# Patient Record
Sex: FEMALE | Race: WHITE | Employment: OTHER | URBAN - METROPOLITAN AREA
[De-identification: names, ages, dates, MRNs, and addresses within clinical notes are randomized per-mention and may not be internally consistent; named-entity substitution may affect disease eponyms.]

---

## 2017-02-16 ENCOUNTER — OFFICE VISIT (OUTPATIENT)
Dept: RETAIL CLINIC | Facility: CLINIC | Age: 62
End: 2017-02-16

## 2017-02-16 VITALS — OXYGEN SATURATION: 97 % | HEART RATE: 72 BPM | TEMPERATURE: 97.9 F

## 2017-02-16 DIAGNOSIS — R50.9 FEVER AND CHILLS: ICD-10-CM

## 2017-02-16 DIAGNOSIS — J06.9 ACUTE URI: Primary | ICD-10-CM

## 2017-02-16 DIAGNOSIS — J02.9 ACUTE PHARYNGITIS, UNSPECIFIED ETIOLOGY: ICD-10-CM

## 2017-02-16 LAB
EXPIRATION DATE: NORMAL
EXPIRATION DATE: NORMAL
FLUAV AG NPH QL: NEGATIVE
FLUBV AG NPH QL: NEGATIVE
INTERNAL CONTROL: NORMAL
INTERNAL CONTROL: NORMAL
Lab: NORMAL
Lab: NORMAL
S PYO AG THROAT QL: NEGATIVE

## 2017-02-16 PROCEDURE — 87880 STREP A ASSAY W/OPTIC: CPT | Performed by: NURSE PRACTITIONER

## 2017-02-16 PROCEDURE — 99203 OFFICE O/P NEW LOW 30 MIN: CPT | Performed by: NURSE PRACTITIONER

## 2017-02-16 PROCEDURE — 87804 INFLUENZA ASSAY W/OPTIC: CPT | Performed by: NURSE PRACTITIONER

## 2017-02-16 RX ORDER — ALBUTEROL SULFATE 90 UG/1
2 AEROSOL, METERED RESPIRATORY (INHALATION) EVERY 4 HOURS PRN
COMMUNITY
End: 2017-07-26 | Stop reason: SDUPTHER

## 2017-02-16 RX ORDER — FLUOXETINE HYDROCHLORIDE 20 MG/1
20 CAPSULE ORAL DAILY
COMMUNITY
End: 2017-05-02

## 2017-02-16 RX ORDER — OMEPRAZOLE 40 MG/1
40 CAPSULE, DELAYED RELEASE ORAL DAILY
COMMUNITY
End: 2017-07-26

## 2017-02-17 NOTE — PATIENT INSTRUCTIONS
"Upper Respiratory Infection, Adult  Most upper respiratory infections (URIs) are caused by a virus. A URI affects the nose, throat, and upper air passages. The most common type of URI is often called \"the common cold.\"  HOME CARE   · Take medicines only as told by your doctor.  · Gargle warm saltwater or take cough drops to comfort your throat as told by your doctor.  · Use a warm mist humidifier or inhale steam from a shower to increase air moisture. This may make it easier to breathe.  · Drink enough fluid to keep your pee (urine) clear or pale yellow.  · Eat soups and other clear broths.  · Have a healthy diet.  · Rest as needed.  · Go back to work when your fever is gone or your doctor says it is okay.  ¨ You may need to stay home longer to avoid giving your URI to others.  ¨ You can also wear a face mask and wash your hands often to prevent spread of the virus.  · Use your inhaler more if you have asthma.  · Do not use any tobacco products, including cigarettes, chewing tobacco, or electronic cigarettes. If you need help quitting, ask your doctor.  GET HELP IF:  · You are getting worse, not better.  · Your symptoms are not helped by medicine.  · You have chills.  · You are getting more short of breath.  · You have brown or red mucus.  · You have yellow or brown discharge from your nose.  · You have pain in your face, especially when you bend forward.  · You have a fever.  · You have puffy (swollen) neck glands.  · You have pain while swallowing.  · You have white areas in the back of your throat.  GET HELP RIGHT AWAY IF:   · You have very bad or constant:    Headache.    Ear pain.    Pain in your forehead, behind your eyes, and over your cheekbones (sinus pain).    Chest pain.  · You have long-lasting (chronic) lung disease and any of the following:    Wheezing.    Long-lasting cough.    Coughing up blood.    A change in your usual mucus.  · You have a stiff neck.  · You have changes in your:    Vision.   "  Hearing.    Thinking.    Mood.  MAKE SURE YOU:   · Understand these instructions.  · Will watch your condition.  · Will get help right away if you are not doing well or get worse.     This information is not intended to replace advice given to you by your health care provider. Make sure you discuss any questions you have with your health care provider.     Document Released: 06/05/2009 Document Revised: 05/03/2016 Document Reviewed: 03/25/2015  [email or web address] Patient Education ©2016 [email or web address]    Use nebulizer tx's as prescribed if needed. Follow up with PCP with changes or worsening symptoms.

## 2017-02-17 NOTE — PROGRESS NOTES
Gladys Cabrales is 62 y.o. female      Cough   This is a new problem. The current episode started in the past 7 days (4-5 days). The problem has been waxing and waning. The problem occurs every few hours. The cough is non-productive. Associated symptoms include a fever, nasal congestion, rhinorrhea, a sore throat and shortness of breath. Pertinent negatives include no chest pain, chills, ear congestion, ear pain, headaches, myalgias, postnasal drip, rash or wheezing. The symptoms are aggravated by lying down. Risk factors for lung disease include smoking/tobacco exposure. She has tried a beta-agonist inhaler and body position changes for the symptoms. The treatment provided moderate relief. Her past medical history is significant for bronchitis and COPD. There is no history of asthma, bronchiectasis, environmental allergies or pneumonia. recent  and exposure to sick relatives.   Sinus Problem   This is a new problem. The current episode started in the past 7 days. The problem has been gradually worsening since onset. Maximum temperature: maybe. Associated symptoms include congestion, coughing, shortness of breath, sneezing and a sore throat. Pertinent negatives include no chills, ear pain, headaches or sinus pressure. Past treatments include saline sprays. The treatment provided mild relief.       Pt is poor historian regarding PMH and meds.      Current Outpatient Prescriptions:   •  albuterol (PROVENTIL HFA;VENTOLIN HFA) 108 (90 BASE) MCG/ACT inhaler, Inhale 2 puffs Every 4 (Four) Hours As Needed for wheezing., Disp: , Rfl:   •  FLUoxetine (PROzac) 20 MG capsule, Take 20 mg by mouth Daily., Disp: , Rfl:   •  INSULIN REGULAR HUMAN, CONC, SC, Inject  under the skin., Disp: , Rfl:   •  omeprazole (priLOSEC) 40 MG capsule, Take 40 mg by mouth Daily., Disp: , Rfl:   •  tiotropium (SPIRIVA) 18 MCG per inhalation capsule, Place 1 capsule into inhaler and inhale Daily., Disp: , Rfl:         Allergies   Allergen  Reactions   • Levaquin [Levofloxacin]    • Rocephin [Ceftriaxone]            Past Medical History   Diagnosis Date   • Acid reflux    • Anxiety    • Arthritis    • COPD (chronic obstructive pulmonary disease)    • Diverticulitis of colon    • Gallbladder abscess    • Hypertension    • Stomach ulcer    • Type 2 diabetes mellitus            No past surgical history on file.        Family History   Problem Relation Age of Onset   • Heart disease Mother    • Cancer Father    • Heart disease Father    • Cancer Brother    • Cancer Maternal Grandfather            Social History     Social History   • Marital status: Single     Spouse name: N/A   • Number of children: N/A   • Years of education: N/A     Occupational History   • unemployed      Social History Main Topics   • Smoking status: Former Smoker     Quit date: 2015   • Smokeless tobacco: Never Used   • Alcohol use No   • Drug use: No   • Sexual activity: Not on file     Other Topics Concern   • Not on file     Social History Narrative   • No narrative on file           Review of Systems   Constitutional: Positive for fever. Negative for activity change, appetite change, chills and fatigue.   HENT: Positive for congestion, rhinorrhea, sneezing and sore throat. Negative for ear discharge, ear pain, mouth sores, nosebleeds, postnasal drip, sinus pressure, trouble swallowing and voice change.    Eyes: Negative.    Respiratory: Positive for cough and shortness of breath. Negative for chest tightness and wheezing.    Cardiovascular: Negative.  Negative for chest pain.   Gastrointestinal: Negative.    Musculoskeletal: Negative for myalgias.   Skin: Negative for rash.   Allergic/Immunologic: Negative for environmental allergies.   Neurological: Negative for headaches.   Psychiatric/Behavioral: Negative.            Physical Exam   Constitutional: She is oriented to person, place, and time. Vital signs are normal. She appears well-developed and well-nourished. She does not  appear ill.   Morbid obesity   HENT:   Head: Normocephalic and atraumatic.   Right Ear: Hearing, tympanic membrane, external ear and ear canal normal.   Left Ear: Hearing, tympanic membrane, external ear and ear canal normal.   Nose: Mucosal edema and rhinorrhea present. Right sinus exhibits no maxillary sinus tenderness and no frontal sinus tenderness. Left sinus exhibits no maxillary sinus tenderness and no frontal sinus tenderness.   Mouth/Throat: Uvula is midline, oropharynx is clear and moist and mucous membranes are normal. No oropharyngeal exudate. No tonsillar exudate.   Eyes: Conjunctivae are normal. Pupils are equal, round, and reactive to light.   Neck: Neck supple.   Cardiovascular: Normal rate, regular rhythm and normal heart sounds.    No murmur heard.  Pulmonary/Chest: Effort normal. No respiratory distress. She has decreased breath sounds in the right lower field and the left lower field. She has no wheezes. She has no rhonchi. She has no rales. She exhibits no tenderness.   Lymphadenopathy:     She has no cervical adenopathy.   Neurological: She is alert and oriented to person, place, and time.   Skin: Skin is warm and dry.   Psychiatric: She has a normal mood and affect. Her behavior is normal.           Gladys was seen today for cough and sinus problem.    Diagnoses and all orders for this visit:    Acute URI    Fever and chills  -     POCT rapid strep A  -     POC Influenza A / B    Acute pharyngitis, unspecified etiology          Education instructions given to patient per diagnosis. Patient/relative verbalizes understanding of instructions.  Follow up with PCP with changes or worsening symptoms.

## 2017-05-02 ENCOUNTER — OFFICE VISIT (OUTPATIENT)
Dept: FAMILY MEDICINE CLINIC | Facility: CLINIC | Age: 62
End: 2017-05-02

## 2017-05-02 VITALS
HEIGHT: 63 IN | SYSTOLIC BLOOD PRESSURE: 124 MMHG | HEART RATE: 74 BPM | BODY MASS INDEX: 51.91 KG/M2 | RESPIRATION RATE: 18 BRPM | DIASTOLIC BLOOD PRESSURE: 74 MMHG | WEIGHT: 293 LBS | TEMPERATURE: 97.2 F

## 2017-05-02 DIAGNOSIS — I10 ESSENTIAL HYPERTENSION: ICD-10-CM

## 2017-05-02 DIAGNOSIS — E04.9 GOITER, NODULAR: ICD-10-CM

## 2017-05-02 DIAGNOSIS — K21.9 GASTROESOPHAGEAL REFLUX DISEASE, ESOPHAGITIS PRESENCE NOT SPECIFIED: ICD-10-CM

## 2017-05-02 DIAGNOSIS — K59.09 CHRONIC CONSTIPATION: ICD-10-CM

## 2017-05-02 DIAGNOSIS — Z80.0 FAMILY HISTORY OF COLON CANCER: ICD-10-CM

## 2017-05-02 DIAGNOSIS — E53.8 VITAMIN B12 DEFICIENCY: ICD-10-CM

## 2017-05-02 DIAGNOSIS — E11.22 TYPE 2 DIABETES MELLITUS WITH CHRONIC KIDNEY DISEASE, WITH LONG-TERM CURRENT USE OF INSULIN, UNSPECIFIED CKD STAGE (HCC): Primary | ICD-10-CM

## 2017-05-02 DIAGNOSIS — J44.9 CHRONIC OBSTRUCTIVE PULMONARY DISEASE, UNSPECIFIED COPD TYPE (HCC): ICD-10-CM

## 2017-05-02 DIAGNOSIS — K63.5 COLON POLYPS: ICD-10-CM

## 2017-05-02 DIAGNOSIS — E78.00 HYPERCHOLESTEROLEMIA: ICD-10-CM

## 2017-05-02 DIAGNOSIS — D50.0 IRON DEFICIENCY ANEMIA DUE TO CHRONIC BLOOD LOSS: ICD-10-CM

## 2017-05-02 DIAGNOSIS — F33.9 EPISODE OF RECURRENT MAJOR DEPRESSIVE DISORDER, UNSPECIFIED DEPRESSION EPISODE SEVERITY (HCC): ICD-10-CM

## 2017-05-02 DIAGNOSIS — Z79.4 TYPE 2 DIABETES MELLITUS WITH CHRONIC KIDNEY DISEASE, WITH LONG-TERM CURRENT USE OF INSULIN, UNSPECIFIED CKD STAGE (HCC): Primary | ICD-10-CM

## 2017-05-02 DIAGNOSIS — K64.9 BLEEDING HEMORRHOIDS: ICD-10-CM

## 2017-05-02 PROCEDURE — 99204 OFFICE O/P NEW MOD 45 MIN: CPT | Performed by: NURSE PRACTITIONER

## 2017-05-02 RX ORDER — DIAZEPAM 5 MG/1
TABLET ORAL
Refills: 0 | COMMUNITY
Start: 2017-04-05 | End: 2017-07-06 | Stop reason: SDUPTHER

## 2017-05-02 RX ORDER — ROSUVASTATIN CALCIUM 10 MG/1
TABLET, COATED ORAL
Refills: 0 | COMMUNITY
Start: 2017-04-09 | End: 2017-07-06 | Stop reason: SDUPTHER

## 2017-05-02 RX ORDER — PEN NEEDLE, DIABETIC 31 GX5/16"
NEEDLE, DISPOSABLE MISCELLANEOUS
Refills: 1 | COMMUNITY
Start: 2017-04-04 | End: 2017-08-11 | Stop reason: SDUPTHER

## 2017-05-02 RX ORDER — INSULIN GLARGINE 100 [IU]/ML
INJECTION, SOLUTION SUBCUTANEOUS
Refills: 1 | COMMUNITY
Start: 2017-04-04 | End: 2017-05-02 | Stop reason: SDUPTHER

## 2017-05-02 RX ORDER — ATENOLOL 50 MG/1
TABLET ORAL
Refills: 0 | COMMUNITY
Start: 2017-04-04 | End: 2017-07-06 | Stop reason: SDUPTHER

## 2017-05-02 RX ORDER — FLUOXETINE HYDROCHLORIDE 40 MG/1
CAPSULE ORAL
Refills: 0 | COMMUNITY
Start: 2017-04-04 | End: 2017-07-06 | Stop reason: SDUPTHER

## 2017-05-02 RX ORDER — INSULIN GLARGINE 100 [IU]/ML
25 INJECTION, SOLUTION SUBCUTANEOUS 2 TIMES DAILY
Qty: 3 PEN | Refills: 1 | Status: SHIPPED | OUTPATIENT
Start: 2017-05-02 | End: 2018-02-15 | Stop reason: SDUPTHER

## 2017-05-02 RX ORDER — OMEPRAZOLE 20 MG/1
CAPSULE, DELAYED RELEASE ORAL
Refills: 0 | COMMUNITY
Start: 2017-04-04 | End: 2017-07-06 | Stop reason: SDUPTHER

## 2017-05-03 DIAGNOSIS — I50.9 CHRONIC CONGESTIVE HEART FAILURE, UNSPECIFIED CONGESTIVE HEART FAILURE TYPE: Primary | ICD-10-CM

## 2017-05-03 DIAGNOSIS — N18.4 CKD (CHRONIC KIDNEY DISEASE) STAGE 4, GFR 15-29 ML/MIN (HCC): Primary | ICD-10-CM

## 2017-05-03 DIAGNOSIS — I10 ESSENTIAL HYPERTENSION: ICD-10-CM

## 2017-05-03 LAB
ALBUMIN SERPL-MCNC: 4.2 G/DL (ref 3.2–4.8)
ALBUMIN/GLOB SERPL: 1.4 G/DL (ref 1.5–2.5)
ALP SERPL-CCNC: 82 U/L (ref 25–100)
ALT SERPL-CCNC: 15 U/L (ref 7–40)
AST SERPL-CCNC: 27 U/L (ref 0–33)
BASOPHILS # BLD AUTO: 0.05 10*3/MM3 (ref 0–0.2)
BASOPHILS NFR BLD AUTO: 0.6 % (ref 0–1)
BILIRUB SERPL-MCNC: 0.4 MG/DL (ref 0.3–1.2)
BUN SERPL-MCNC: 26 MG/DL (ref 9–23)
BUN/CREAT SERPL: 14.4 (ref 7–25)
CALCIUM SERPL-MCNC: 9.7 MG/DL (ref 8.7–10.4)
CHLORIDE SERPL-SCNC: 103 MMOL/L (ref 99–109)
CO2 SERPL-SCNC: 28 MMOL/L (ref 20–31)
CREAT SERPL-MCNC: 1.8 MG/DL (ref 0.6–1.3)
DIFFERENTIAL COMMENT: NORMAL
EOSINOPHIL # BLD AUTO: 0.25 10*3/MM3 (ref 0.1–0.3)
EOSINOPHIL NFR BLD AUTO: 2.8 % (ref 0–3)
ERYTHROCYTE [DISTWIDTH] IN BLOOD BY AUTOMATED COUNT: 16.8 % (ref 11.3–14.5)
GLOBULIN SER CALC-MCNC: 3 GM/DL
GLUCOSE SERPL-MCNC: 272 MG/DL (ref 70–100)
HBA1C MFR BLD: 8.3 % (ref 4.8–5.6)
HCT VFR BLD AUTO: 33.1 % (ref 34.5–44)
HGB BLD-MCNC: 9.6 G/DL (ref 11.5–15.5)
IMM GRANULOCYTES # BLD: 0.01 10*3/MM3 (ref 0–0.03)
IMM GRANULOCYTES NFR BLD: 0.1 % (ref 0–0.6)
IRON SATN MFR SERPL: 6 % (ref 15–50)
IRON SERPL-MCNC: 27 MCG/DL (ref 50–175)
LYMPHOCYTES # BLD AUTO: 2.68 10*3/MM3 (ref 0.6–4.8)
LYMPHOCYTES NFR BLD AUTO: 30.4 % (ref 24–44)
MCH RBC QN AUTO: 23.8 PG (ref 27–31)
MCHC RBC AUTO-ENTMCNC: 29 G/DL (ref 32–36)
MCV RBC AUTO: 82.1 FL (ref 80–99)
MONOCYTES # BLD AUTO: 0.56 10*3/MM3 (ref 0–1)
MONOCYTES NFR BLD AUTO: 6.3 % (ref 0–12)
NEUTROPHILS # BLD AUTO: 5.28 10*3/MM3 (ref 1.5–8.3)
NEUTROPHILS NFR BLD AUTO: 59.8 % (ref 41–71)
PLATELET # BLD AUTO: 254 10*3/MM3 (ref 150–450)
PLATELET BLD QL SMEAR: NORMAL
POTASSIUM SERPL-SCNC: 4.8 MMOL/L (ref 3.5–5.5)
PROT SERPL-MCNC: 7.2 G/DL (ref 5.7–8.2)
RBC # BLD AUTO: 4.03 10*6/MM3 (ref 3.89–5.14)
RBC MORPH BLD: NORMAL
SODIUM SERPL-SCNC: 144 MMOL/L (ref 132–146)
TIBC SERPL-MCNC: 416 MCG/DL (ref 250–450)
TSH SERPL DL<=0.005 MIU/L-ACNC: 2.24 MIU/ML (ref 0.35–5.35)
UIBC SERPL-MCNC: 389 MCG/DL
VIT B12 SERPL-MCNC: 398 PG/ML (ref 211–911)
WBC # BLD AUTO: 8.83 10*3/MM3 (ref 3.5–10.8)

## 2017-07-06 RX ORDER — DIAZEPAM 5 MG/1
TABLET ORAL
Qty: 30 TABLET | Refills: 0 | Status: SHIPPED | OUTPATIENT
Start: 2017-07-06 | End: 2018-03-19 | Stop reason: SDUPTHER

## 2017-07-06 RX ORDER — FLUOXETINE HYDROCHLORIDE 40 MG/1
CAPSULE ORAL
Qty: 30 CAPSULE | Refills: 0 | Status: SHIPPED | OUTPATIENT
Start: 2017-07-06 | End: 2017-07-26 | Stop reason: SDUPTHER

## 2017-07-06 RX ORDER — ROSUVASTATIN CALCIUM 10 MG/1
TABLET, COATED ORAL
Qty: 30 TABLET | Refills: 0 | Status: SHIPPED | OUTPATIENT
Start: 2017-07-06 | End: 2017-07-26 | Stop reason: SDUPTHER

## 2017-07-06 RX ORDER — OMEPRAZOLE 20 MG/1
CAPSULE, DELAYED RELEASE ORAL
Qty: 30 CAPSULE | Refills: 0 | Status: SHIPPED | OUTPATIENT
Start: 2017-07-06 | End: 2017-07-26 | Stop reason: SDUPTHER

## 2017-07-06 RX ORDER — ATENOLOL 50 MG/1
TABLET ORAL
Qty: 30 TABLET | Refills: 0 | Status: SHIPPED | OUTPATIENT
Start: 2017-07-06 | End: 2017-07-26 | Stop reason: SDUPTHER

## 2017-07-26 ENCOUNTER — OFFICE VISIT (OUTPATIENT)
Dept: FAMILY MEDICINE CLINIC | Facility: CLINIC | Age: 62
End: 2017-07-26

## 2017-07-26 VITALS
HEART RATE: 68 BPM | DIASTOLIC BLOOD PRESSURE: 78 MMHG | TEMPERATURE: 98.3 F | SYSTOLIC BLOOD PRESSURE: 128 MMHG | BODY MASS INDEX: 51.91 KG/M2 | OXYGEN SATURATION: 98 % | HEIGHT: 63 IN | WEIGHT: 293 LBS | RESPIRATION RATE: 20 BRPM

## 2017-07-26 DIAGNOSIS — F32.A ANXIETY AND DEPRESSION: ICD-10-CM

## 2017-07-26 DIAGNOSIS — J44.9 CHRONIC OBSTRUCTIVE PULMONARY DISEASE, UNSPECIFIED COPD TYPE (HCC): ICD-10-CM

## 2017-07-26 DIAGNOSIS — F41.9 ANXIETY AND DEPRESSION: ICD-10-CM

## 2017-07-26 DIAGNOSIS — I10 ESSENTIAL HYPERTENSION: ICD-10-CM

## 2017-07-26 DIAGNOSIS — D50.0 IRON DEFICIENCY ANEMIA DUE TO CHRONIC BLOOD LOSS: ICD-10-CM

## 2017-07-26 DIAGNOSIS — Z79.4 DIABETES MELLITUS TYPE 2, INSULIN DEPENDENT (HCC): Primary | ICD-10-CM

## 2017-07-26 DIAGNOSIS — E11.9 DIABETES MELLITUS TYPE 2, INSULIN DEPENDENT (HCC): Primary | ICD-10-CM

## 2017-07-26 DIAGNOSIS — K64.9 HEMORRHOIDS, UNSPECIFIED HEMORRHOID TYPE: ICD-10-CM

## 2017-07-26 DIAGNOSIS — R53.83 FATIGUE, UNSPECIFIED TYPE: ICD-10-CM

## 2017-07-26 DIAGNOSIS — E78.00 ELEVATED CHOLESTEROL: ICD-10-CM

## 2017-07-26 PROCEDURE — 99214 OFFICE O/P EST MOD 30 MIN: CPT | Performed by: NURSE PRACTITIONER

## 2017-07-26 RX ORDER — OMEPRAZOLE 20 MG/1
20 CAPSULE, DELAYED RELEASE ORAL DAILY
Qty: 90 CAPSULE | Refills: 1 | Status: SHIPPED | OUTPATIENT
Start: 2017-07-26

## 2017-07-26 RX ORDER — ALBUTEROL SULFATE 90 UG/1
2 AEROSOL, METERED RESPIRATORY (INHALATION) EVERY 4 HOURS PRN
Qty: 18 G | Refills: 5 | Status: SHIPPED | OUTPATIENT
Start: 2017-07-26

## 2017-07-26 RX ORDER — FLUOXETINE HYDROCHLORIDE 40 MG/1
40 CAPSULE ORAL DAILY
Qty: 90 CAPSULE | Refills: 1 | Status: SHIPPED | OUTPATIENT
Start: 2017-07-26 | End: 2018-02-15 | Stop reason: SDUPTHER

## 2017-07-26 RX ORDER — ATENOLOL 50 MG/1
50 TABLET ORAL DAILY
Qty: 90 TABLET | Refills: 1 | Status: SHIPPED | OUTPATIENT
Start: 2017-07-26 | End: 2018-02-04 | Stop reason: SDUPTHER

## 2017-07-26 RX ORDER — ALBUTEROL SULFATE 2.5 MG/3ML
2.5 SOLUTION RESPIRATORY (INHALATION) EVERY 4 HOURS PRN
COMMUNITY

## 2017-07-26 RX ORDER — ROSUVASTATIN CALCIUM 10 MG/1
10 TABLET, COATED ORAL NIGHTLY
Qty: 90 TABLET | Refills: 1 | Status: SHIPPED | OUTPATIENT
Start: 2017-07-26 | End: 2018-06-27 | Stop reason: SDUPTHER

## 2017-07-26 NOTE — PROGRESS NOTES
Subjective   Gladys Cabrales is a 62 y.o. female.     History of Present Illness   F/U DM, CKD, COPD  Had an appt with Dr Champion was told kidney function had improving; being treated for UTI by Dr Champion  Sees Cardiologist Aug 23, 2017 for CHF  Still having SOA, LOPEZ, no CP, no palpitations, no swelling in lower extremities  Needs refills on meds  Taking DM meds, but not following diet. Low BS once, but did not eat like she was supposed to  Had been checking BS, but stopped, did not record it.  COPD  Needs refill on her inhaler  Due for eye exam, + hx of cataracts  Has screening colonoscopy scheduled      The following portions of the patient's history were reviewed and updated as appropriate: allergies, current medications, past family history, past medical history, past social history, past surgical history and problem list.    Review of Systems   Constitutional: Negative.    HENT: Negative.    Eyes: Positive for visual disturbance (cannot see out of her right eye very well, was supposed to have cataract surgery but did not have it. Due for eye exam).   Respiratory: Positive for shortness of breath.    Cardiovascular: Negative.  Negative for chest pain and palpitations.   Gastrointestinal: Positive for blood in stool and constipation. Negative for abdominal pain. Anal bleeding:  +hx of constipation and bleeding hemorrhoids, due for colonoscopy.   Endocrine: Negative.    Genitourinary: Negative.    Musculoskeletal: Negative.    Skin: Negative.    Allergic/Immunologic: Negative.    Neurological: Negative.  Negative for dizziness and headaches.   Hematological: Negative.    Psychiatric/Behavioral: Positive for dysphoric mood. The patient is nervous/anxious.        Objective   Physical Exam   Constitutional: She is oriented to person, place, and time. She appears well-developed and well-nourished. No distress.   HENT:   Head: Normocephalic.   Neck: Neck supple. No thyromegaly present.   Cardiovascular: Normal rate, regular  rhythm and normal heart sounds.    Pulmonary/Chest: Effort normal and breath sounds normal.   Abdominal: Soft. Bowel sounds are normal. She exhibits no distension. There is no tenderness.   Musculoskeletal: She exhibits no edema.   Lymphadenopathy:     She has no cervical adenopathy.   Neurological: She is alert and oriented to person, place, and time.   Skin: Skin is warm and dry.   Psychiatric: She has a normal mood and affect. Her behavior is normal.   Nursing note and vitals reviewed.      Assessment/Plan   Gladys was seen today for follow-up, diabetes, copd and chronic kidney disease.    Diagnoses and all orders for this visit:    Diabetes mellitus type 2, insulin dependent  -     Hemoglobin A1c; Future    Essential hypertension  -     TSH; Future  -     Comprehensive metabolic panel; Future    Chronic obstructive pulmonary disease, unspecified COPD type    Elevated cholesterol  -     Lipid panel; Future    Anxiety and depression    Iron deficiency anemia due to chronic blood loss  -     CBC w AUTO Differential; Future  -     Iron and TIBC; Future    Hemorrhoids, unspecified hemorrhoid type    Fatigue, unspecified type    Other orders  -     rosuvastatin (CRESTOR) 10 MG tablet; Take 1 tablet by mouth Every Night.  -     omeprazole (priLOSEC) 20 MG capsule; Take 1 capsule by mouth Daily.  -     FLUoxetine (PROzac) 40 MG capsule; Take 1 capsule by mouth Daily.  -     atenolol (TENORMIN) 50 MG tablet; Take 1 tablet by mouth Daily.  -     albuterol (PROVENTIL HFA;VENTOLIN HFA) 108 (90 BASE) MCG/ACT inhaler; Inhale 2 puffs Every 4 (Four) Hours As Needed for Wheezing.      Will have pt return in 2 weeks to have fasting labs  Will refill meds  Will have pt schedule eye exam  Will have pt keep Cardiology appt  Will schedule screening mammogram at follow up visit since pt has trouble standing for the testing.  Will see pt back in 3 months

## 2017-07-31 ENCOUNTER — RESULTS ENCOUNTER (OUTPATIENT)
Dept: FAMILY MEDICINE CLINIC | Facility: CLINIC | Age: 62
End: 2017-07-31

## 2017-07-31 DIAGNOSIS — E78.00 ELEVATED CHOLESTEROL: ICD-10-CM

## 2017-07-31 DIAGNOSIS — Z79.4 DIABETES MELLITUS TYPE 2, INSULIN DEPENDENT (HCC): ICD-10-CM

## 2017-07-31 DIAGNOSIS — I10 ESSENTIAL HYPERTENSION: ICD-10-CM

## 2017-07-31 DIAGNOSIS — E11.9 DIABETES MELLITUS TYPE 2, INSULIN DEPENDENT (HCC): ICD-10-CM

## 2017-07-31 DIAGNOSIS — D50.0 IRON DEFICIENCY ANEMIA DUE TO CHRONIC BLOOD LOSS: ICD-10-CM

## 2017-08-11 RX ORDER — PEN NEEDLE, DIABETIC 31 GX5/16"
NEEDLE, DISPOSABLE MISCELLANEOUS
Qty: 100 EACH | Refills: 4 | Status: SHIPPED | OUTPATIENT
Start: 2017-08-11

## 2017-08-19 LAB
ALBUMIN SERPL-MCNC: 3.8 G/DL (ref 3.2–4.8)
ALBUMIN/GLOB SERPL: 1.5 G/DL (ref 1.5–2.5)
ALP SERPL-CCNC: 101 U/L (ref 25–100)
ALT SERPL-CCNC: 13 U/L (ref 7–40)
AST SERPL-CCNC: 21 U/L (ref 0–33)
BASOPHILS # BLD AUTO: 0.04 10*3/MM3 (ref 0–0.2)
BASOPHILS NFR BLD AUTO: 0.7 % (ref 0–1)
BILIRUB SERPL-MCNC: 0.3 MG/DL (ref 0.3–1.2)
BUN SERPL-MCNC: 25 MG/DL (ref 9–23)
BUN/CREAT SERPL: 19.2 (ref 7–25)
CALCIUM SERPL-MCNC: 9.8 MG/DL (ref 8.7–10.4)
CHLORIDE SERPL-SCNC: 106 MMOL/L (ref 99–109)
CHOLEST SERPL-MCNC: 170 MG/DL (ref 0–200)
CO2 SERPL-SCNC: 21 MMOL/L (ref 20–31)
CREAT SERPL-MCNC: 1.3 MG/DL (ref 0.6–1.3)
EOSINOPHIL # BLD AUTO: 0.18 10*3/MM3 (ref 0–0.3)
EOSINOPHIL NFR BLD AUTO: 3.3 % (ref 0–3)
ERYTHROCYTE [DISTWIDTH] IN BLOOD BY AUTOMATED COUNT: 18.7 % (ref 11.3–14.5)
GLOBULIN SER CALC-MCNC: 2.6 GM/DL
GLUCOSE SERPL-MCNC: 183 MG/DL (ref 70–100)
HBA1C MFR BLD: 7.7 % (ref 4.8–5.6)
HCT VFR BLD AUTO: 30 % (ref 34.5–44)
HDLC SERPL-MCNC: 45 MG/DL (ref 40–60)
HGB BLD-MCNC: 8.1 G/DL (ref 11.5–15.5)
IMM GRANULOCYTES # BLD: 0.01 10*3/MM3 (ref 0–0.03)
IMM GRANULOCYTES NFR BLD: 0.2 % (ref 0–0.6)
IRON SATN MFR SERPL: 6 % (ref 15–50)
IRON SERPL-MCNC: 22 MCG/DL (ref 50–175)
LDLC SERPL CALC-MCNC: 84 MG/DL (ref 0–100)
LYMPHOCYTES # BLD AUTO: 2.31 10*3/MM3 (ref 0.6–4.8)
LYMPHOCYTES NFR BLD AUTO: 41.8 % (ref 24–44)
MCH RBC QN AUTO: 20.9 PG (ref 27–31)
MCHC RBC AUTO-ENTMCNC: 27 G/DL (ref 32–36)
MCV RBC AUTO: 77.5 FL (ref 80–99)
MONOCYTES # BLD AUTO: 0.34 10*3/MM3 (ref 0–1)
MONOCYTES NFR BLD AUTO: 6.2 % (ref 0–12)
NEUTROPHILS # BLD AUTO: 2.64 10*3/MM3 (ref 1.5–8.3)
NEUTROPHILS NFR BLD AUTO: 47.8 % (ref 41–71)
PLATELET # BLD AUTO: 220 10*3/MM3 (ref 150–450)
POTASSIUM SERPL-SCNC: 4.7 MMOL/L (ref 3.5–5.5)
PROT SERPL-MCNC: 6.4 G/DL (ref 5.7–8.2)
RBC # BLD AUTO: 3.87 10*6/MM3 (ref 3.89–5.14)
SODIUM SERPL-SCNC: 138 MMOL/L (ref 132–146)
TIBC SERPL-MCNC: 377 MCG/DL (ref 250–450)
TRIGL SERPL-MCNC: 203 MG/DL (ref 0–150)
TSH SERPL DL<=0.005 MIU/L-ACNC: 2.21 MIU/ML (ref 0.35–5.35)
UIBC SERPL-MCNC: 355 MCG/DL
VLDLC SERPL CALC-MCNC: 40.6 MG/DL
WBC # BLD AUTO: 5.52 10*3/MM3 (ref 3.5–10.8)

## 2017-08-24 ENCOUNTER — TELEPHONE (OUTPATIENT)
Dept: FAMILY MEDICINE CLINIC | Facility: CLINIC | Age: 62
End: 2017-08-24

## 2017-08-24 DIAGNOSIS — Z79.4 TYPE 2 DIABETES MELLITUS WITH COMPLICATION, WITH LONG-TERM CURRENT USE OF INSULIN (HCC): Primary | ICD-10-CM

## 2017-08-24 DIAGNOSIS — E11.8 TYPE 2 DIABETES MELLITUS WITH COMPLICATION, WITH LONG-TERM CURRENT USE OF INSULIN (HCC): Primary | ICD-10-CM

## 2017-08-24 NOTE — TELEPHONE ENCOUNTER
----- Message from Maryuri Yap sent at 8/24/2017 11:30 AM EDT -----  Contact: CARINA GUILLERMINA  PATIENT IS NEEDING A SCRIPT FOR HER TEST STRIPS FREE STYLE LITE SHE TESTS 2 X  A DAY OR AS NEEDED PLEASE SEND OVER TO  YASMINE HODGE PATIENT CAN BE REACHED FOR QUESTIONS  [phone]

## 2017-11-14 ENCOUNTER — OFFICE VISIT (OUTPATIENT)
Dept: FAMILY MEDICINE CLINIC | Facility: CLINIC | Age: 62
End: 2017-11-14

## 2017-11-14 VITALS
HEIGHT: 63 IN | TEMPERATURE: 97.1 F | RESPIRATION RATE: 24 BRPM | DIASTOLIC BLOOD PRESSURE: 70 MMHG | BODY MASS INDEX: 51.91 KG/M2 | WEIGHT: 293 LBS | SYSTOLIC BLOOD PRESSURE: 130 MMHG | HEART RATE: 60 BPM

## 2017-11-14 DIAGNOSIS — I50.9 CHRONIC CONGESTIVE HEART FAILURE, UNSPECIFIED CONGESTIVE HEART FAILURE TYPE: ICD-10-CM

## 2017-11-14 DIAGNOSIS — R00.1 BRADYCARDIA: ICD-10-CM

## 2017-11-14 DIAGNOSIS — E11.65 TYPE 2 DIABETES MELLITUS WITH HYPERGLYCEMIA, WITH LONG-TERM CURRENT USE OF INSULIN (HCC): ICD-10-CM

## 2017-11-14 DIAGNOSIS — R07.9 CHEST PAIN RADIATING TO JAW: ICD-10-CM

## 2017-11-14 DIAGNOSIS — N18.4 CHRONIC RENAL IMPAIRMENT, STAGE 4 (SEVERE) (HCC): ICD-10-CM

## 2017-11-14 DIAGNOSIS — Z01.818 PRE-OP EVALUATION: Primary | ICD-10-CM

## 2017-11-14 DIAGNOSIS — Z79.4 TYPE 2 DIABETES MELLITUS WITH HYPERGLYCEMIA, WITH LONG-TERM CURRENT USE OF INSULIN (HCC): ICD-10-CM

## 2017-11-14 LAB
ALBUMIN SERPL-MCNC: 4.1 G/DL (ref 3.2–4.8)
ALBUMIN/GLOB SERPL: 1.5 G/DL (ref 1.5–2.5)
ALP SERPL-CCNC: 106 U/L (ref 25–100)
ALT SERPL-CCNC: 16 U/L (ref 7–40)
AST SERPL-CCNC: 23 U/L (ref 0–33)
BASOPHILS # BLD AUTO: 0.06 10*3/MM3 (ref 0–0.2)
BASOPHILS NFR BLD AUTO: 0.8 % (ref 0–1)
BILIRUB SERPL-MCNC: 0.2 MG/DL (ref 0.3–1.2)
BUN SERPL-MCNC: 36 MG/DL (ref 9–23)
BUN/CREAT SERPL: 20 (ref 7–25)
CALCIUM SERPL-MCNC: 9.1 MG/DL (ref 8.7–10.4)
CHLORIDE SERPL-SCNC: 107 MMOL/L (ref 99–109)
CO2 SERPL-SCNC: 26 MMOL/L (ref 20–31)
CREAT SERPL-MCNC: 1.8 MG/DL (ref 0.6–1.3)
EOSINOPHIL # BLD AUTO: 0.33 10*3/MM3 (ref 0–0.3)
EOSINOPHIL NFR BLD AUTO: 4.4 % (ref 0–3)
ERYTHROCYTE [DISTWIDTH] IN BLOOD BY AUTOMATED COUNT: 18.3 % (ref 11.3–14.5)
GFR SERPLBLD CREATININE-BSD FMLA CKD-EPI: 28 ML/MIN/1.73
GFR SERPLBLD CREATININE-BSD FMLA CKD-EPI: 34 ML/MIN/1.73
GLOBULIN SER CALC-MCNC: 2.8 GM/DL
GLUCOSE SERPL-MCNC: 183 MG/DL (ref 70–100)
HBA1C MFR BLD: 7.5 % (ref 4.8–5.6)
HCT VFR BLD AUTO: 33.3 % (ref 34.5–44)
HGB BLD-MCNC: 9.2 G/DL (ref 11.5–15.5)
IMM GRANULOCYTES # BLD: 0.01 10*3/MM3 (ref 0–0.03)
IMM GRANULOCYTES NFR BLD: 0.1 % (ref 0–0.6)
LYMPHOCYTES # BLD AUTO: 2.84 10*3/MM3 (ref 0.6–4.8)
LYMPHOCYTES NFR BLD AUTO: 37.7 % (ref 24–44)
MCH RBC QN AUTO: 22.1 PG (ref 27–31)
MCHC RBC AUTO-ENTMCNC: 27.6 G/DL (ref 32–36)
MCV RBC AUTO: 79.9 FL (ref 80–99)
MONOCYTES # BLD AUTO: 0.56 10*3/MM3 (ref 0–1)
MONOCYTES NFR BLD AUTO: 7.4 % (ref 0–12)
NEUTROPHILS # BLD AUTO: 3.73 10*3/MM3 (ref 1.5–8.3)
NEUTROPHILS NFR BLD AUTO: 49.6 % (ref 41–71)
PLATELET # BLD AUTO: 290 10*3/MM3 (ref 150–450)
POTASSIUM SERPL-SCNC: 5 MMOL/L (ref 3.5–5.5)
PROT SERPL-MCNC: 6.9 G/DL (ref 5.7–8.2)
RBC # BLD AUTO: 4.17 10*6/MM3 (ref 3.89–5.14)
SODIUM SERPL-SCNC: 142 MMOL/L (ref 132–146)
WBC # BLD AUTO: 7.53 10*3/MM3 (ref 3.5–10.8)

## 2017-11-14 PROCEDURE — 93000 ELECTROCARDIOGRAM COMPLETE: CPT | Performed by: NURSE PRACTITIONER

## 2017-11-14 PROCEDURE — 99214 OFFICE O/P EST MOD 30 MIN: CPT | Performed by: NURSE PRACTITIONER

## 2017-11-14 RX ORDER — PROMETHAZINE HYDROCHLORIDE 25 MG/1
25 TABLET ORAL EVERY 6 HOURS PRN
Qty: 30 TABLET | Refills: 0 | Status: SHIPPED | OUTPATIENT
Start: 2017-11-14

## 2017-11-14 RX ORDER — PANTOPRAZOLE SODIUM 40 MG/1
40 TABLET, DELAYED RELEASE ORAL DAILY
Qty: 30 TABLET | Refills: 5 | Status: SHIPPED | OUTPATIENT
Start: 2017-11-14

## 2017-11-14 NOTE — PROGRESS NOTES
Subjective   Gladys Cabrales is a 62 y.o. female.     History of Present Illness   Pre-op for right cataract surgery 11/28 and 12/12 to have left cataract removed    Having chest pains and palpitations, nausea and pressure on her chest; CP radiating into left jaw, 2 days ago CP woke her up in the night  Feeling lightheaded and shortness of breath with exertion, fatigue, just not feeling good lately.  This morning she was having heart palpitations.  Thinks she is having problems with her stomach again, having RUQ pain, Omeprazole not helping.  No hx of heart attack or blockages in her heart  Was supposed to go see cardiologist but did not keep appt (kept telling her daughter she didn't feel like getting out to go)  Heart cath 12 years ago, heart was enlarged but no blockages, + hx CHF, DM type 2 on insulin, obesity, HLP, HTN  Twin brother with had CAD 7 stents    Has had general anesthesia in the past,   Difficulty breathing with last surgery was intubated and went to ICU after gallbladder surgery 2007  SOA with exertion, no swelling in ankles or feet  Quit smoking 4 years ago  HTN, DM type 2, CHF, COPD, HLP, anxiety, GERD     The following portions of the patient's history were reviewed and updated as appropriate: allergies, current medications, past family history, past medical history, past social history, past surgical history and problem list.    Review of Systems   Constitutional: Positive for activity change and fatigue.   HENT: Negative.    Eyes: Positive for visual disturbance.   Respiratory: Positive for chest tightness and shortness of breath.    Cardiovascular: Positive for chest pain and palpitations. Negative for leg swelling.   Endocrine: Negative.    Genitourinary: Negative.    Musculoskeletal: Negative.    Skin: Negative.    Allergic/Immunologic: Negative.    Neurological: Positive for light-headedness.   Hematological: Negative.    Psychiatric/Behavioral: Positive for sleep disturbance.       Objective    Physical Exam   Constitutional: She is oriented to person, place, and time. She appears well-developed and well-nourished.   HENT:   Head: Normocephalic.   Nose: Nose normal.   Eyes: Lids are normal.   Neck: Trachea normal and normal range of motion. Neck supple. Decreased carotid pulses present. No JVD present. Carotid bruit is not present. No thyromegaly present.   Cardiovascular: Regular rhythm, S1 normal and S2 normal.  Bradycardia present.  Exam reveals distant heart sounds.    Pulmonary/Chest: Effort normal and breath sounds normal.   Musculoskeletal: She exhibits no edema.   Lymphadenopathy:     She has no cervical adenopathy.   Neurological: She is alert and oriented to person, place, and time.   Skin: Skin is warm and dry. She is not diaphoretic. There is pallor.   Psychiatric: She has a normal mood and affect. Her speech is normal and behavior is normal. Judgment and thought content normal. Cognition and memory are normal.   Nursing note and vitals reviewed.      ECG 12 Lead  Date/Time: 11/14/2017 9:51 AM  Performed by: SARAN LIM  Authorized by: SARAN LIM   Previous ECG: no previous ECG available  Rhythm: sinus bradycardia  Rate: bradycardic  BPM: 50  ST Segments: ST segments normal  T Waves: T waves normal  Other findings: prolonged QTc interval  Clinical impression: abnormal ECG  Comments: Possible anterior infarct-age undetermined          Assessment/Plan   Gladys was seen today for pre-op exam.    Diagnoses and all orders for this visit:    Pre-op evaluation  -     Comprehensive Metabolic Panel  -     CBC & Differential  -     ECG 12 Lead    Chest pain radiating to jaw  -     Stress Test With Myocardial Perfusion - One Day  -     ECG 12 Lead  -     Ambulatory Referral to Cardiology    Bradycardia  -     Stress Test With Myocardial Perfusion - One Day  -     Ambulatory Referral to Cardiology    Chronic congestive heart failure, unspecified congestive heart failure type  -     Stress Test With  Myocardial Perfusion - One Day  -     Ambulatory Referral to Cardiology    Chronic renal impairment, stage 4 (severe)    Type 2 diabetes mellitus with hyperglycemia, with long-term current use of insulin  -     Hemoglobin A1c    Other orders  -     promethazine (PHENERGAN) 25 MG tablet; Take 1 tablet by mouth Every 6 (Six) Hours As Needed for Nausea or Vomiting.  -     pantoprazole (PROTONIX) 40 MG EC tablet; Take 1 tablet by mouth Daily.      Will have pt get labs and EKG   EKG shows bradycardia.   Labs show elevation of glucose but normal A1c, impaired renal function stage 4, and anemia.     Recommend pt hold off on cataract surgery until she has cardiac clearance and perfusion study due to pt reports of chest pain/palpitations. Referral to cardiology.

## 2017-11-15 PROBLEM — I50.9 CHRONIC CONGESTIVE HEART FAILURE (HCC): Status: ACTIVE | Noted: 2017-11-15

## 2017-11-15 PROBLEM — N18.4 CHRONIC RENAL IMPAIRMENT, STAGE 4 (SEVERE) (HCC): Status: ACTIVE | Noted: 2017-11-15

## 2017-11-15 PROBLEM — E11.65 TYPE 2 DIABETES MELLITUS WITH HYPERGLYCEMIA, WITH LONG-TERM CURRENT USE OF INSULIN (HCC): Status: ACTIVE | Noted: 2017-11-15

## 2017-11-15 PROBLEM — Z79.4 TYPE 2 DIABETES MELLITUS WITH HYPERGLYCEMIA, WITH LONG-TERM CURRENT USE OF INSULIN (HCC): Status: ACTIVE | Noted: 2017-11-15

## 2017-11-22 ENCOUNTER — TELEPHONE (OUTPATIENT)
Dept: CARDIOLOGY | Facility: CLINIC | Age: 62
End: 2017-11-22

## 2017-11-22 NOTE — TELEPHONE ENCOUNTER
Left VM advising will need to have stress test before appt due to providers needing that info for pre op clearance recommendations.

## 2017-12-05 ENCOUNTER — HOSPITAL ENCOUNTER (OUTPATIENT)
Dept: CARDIOLOGY | Facility: HOSPITAL | Age: 62
Discharge: HOME | End: 2017-12-05

## 2017-12-05 VITALS — HEART RATE: 55 BPM | BODY MASS INDEX: 61.11 KG/M2 | WEIGHT: 293 LBS

## 2017-12-05 PROCEDURE — A9500 TC99M SESTAMIBI: HCPCS | Performed by: NURSE PRACTITIONER

## 2017-12-05 PROCEDURE — 93017 CV STRESS TEST TRACING ONLY: CPT

## 2017-12-05 PROCEDURE — 0 TECHNETIUM SESTAMIBI: Performed by: NURSE PRACTITIONER

## 2017-12-05 PROCEDURE — 78452 HT MUSCLE IMAGE SPECT MULT: CPT | Performed by: INTERNAL MEDICINE

## 2017-12-05 PROCEDURE — 78452 HT MUSCLE IMAGE SPECT MULT: CPT

## 2017-12-05 PROCEDURE — 93018 CV STRESS TEST I&R ONLY: CPT | Performed by: INTERNAL MEDICINE

## 2017-12-05 PROCEDURE — 25010000002 REGADENOSON 0.4 MG/5ML SOLUTION: Performed by: NURSE PRACTITIONER

## 2017-12-05 RX ADMIN — TECHNETIUM TC-99M SESTAMIBI 1 DOSE: 1 INJECTION INTRAVENOUS at 09:15

## 2017-12-05 RX ADMIN — TECHNETIUM TC-99M SESTAMIBI 1 DOSE: 1 INJECTION INTRAVENOUS at 07:45

## 2017-12-05 RX ADMIN — REGADENOSON 0.4 MG: 0.08 INJECTION, SOLUTION INTRAVENOUS at 09:16

## 2017-12-07 LAB
BH CV STRESS BP STAGE 1: NORMAL
BH CV STRESS BP STAGE 2: NORMAL
BH CV STRESS BP STAGE 4: NORMAL
BH CV STRESS COMMENTS STAGE 1: NORMAL
BH CV STRESS DOSE REGADENOSON STAGE 1: 0.4
BH CV STRESS DURATION MIN STAGE 1: 1
BH CV STRESS DURATION MIN STAGE 2: 1
BH CV STRESS DURATION MIN STAGE 3: 1
BH CV STRESS DURATION MIN STAGE 4: 1
BH CV STRESS DURATION SEC STAGE 1: 15
BH CV STRESS DURATION SEC STAGE 2: 0
BH CV STRESS HR STAGE 1: 77
BH CV STRESS HR STAGE 2: 77
BH CV STRESS HR STAGE 3: 71
BH CV STRESS HR STAGE 4: 68
BH CV STRESS O2 STAGE 1: 99
BH CV STRESS O2 STAGE 2: 99
BH CV STRESS O2 STAGE 3: 100
BH CV STRESS PROTOCOL 1: NORMAL
BH CV STRESS RECOVERY BP: NORMAL MMHG
BH CV STRESS RECOVERY HR: 68 BPM
BH CV STRESS STAGE 1: 1
BH CV STRESS STAGE 2: 2
BH CV STRESS STAGE 3: 3
BH CV STRESS STAGE 4: 4
LV EF NUC BP: 53 %
MAXIMAL PREDICTED HEART RATE: 156 BPM
PERCENT MAX PREDICTED HR: 51.28 %
STRESS BASELINE BP: NORMAL MMHG
STRESS BASELINE HR: 54 BPM
STRESS PERCENT HR: 60 %
STRESS POST PEAK BP: NORMAL MMHG
STRESS POST PEAK HR: 80 BPM
STRESS TARGET HR: 133 BPM

## 2017-12-20 ENCOUNTER — OFFICE VISIT (OUTPATIENT)
Dept: CARDIOLOGY | Facility: CLINIC | Age: 62
End: 2017-12-20

## 2017-12-20 VITALS
DIASTOLIC BLOOD PRESSURE: 76 MMHG | HEIGHT: 61 IN | WEIGHT: 293 LBS | BODY MASS INDEX: 55.32 KG/M2 | SYSTOLIC BLOOD PRESSURE: 118 MMHG | HEART RATE: 59 BPM

## 2017-12-20 DIAGNOSIS — I20.9 ANGINA PECTORIS (HCC): ICD-10-CM

## 2017-12-20 DIAGNOSIS — R94.39 ABNORMAL MYOCARDIAL PERFUSION STUDY: Primary | ICD-10-CM

## 2017-12-20 DIAGNOSIS — K21.9 GASTROESOPHAGEAL REFLUX DISEASE, ESOPHAGITIS PRESENCE NOT SPECIFIED: ICD-10-CM

## 2017-12-20 DIAGNOSIS — I10 ESSENTIAL HYPERTENSION: ICD-10-CM

## 2017-12-20 DIAGNOSIS — E78.5 DYSLIPIDEMIA: ICD-10-CM

## 2017-12-20 PROCEDURE — 99204 OFFICE O/P NEW MOD 45 MIN: CPT | Performed by: INTERNAL MEDICINE

## 2017-12-20 NOTE — PROGRESS NOTES
Subjective:     Encounter Date:12/20/2017      Patient ID: Gladys Cabrales is a 62 y.o. female.    Chief Complaint:Chest Pain (CONSULT) and Congestive Heart Failure    PROBLEM LIST:  1. Chest pain  a. 12/5/17 MPS with inferior and lateral wall infarction. No ischemia. EF 53%.  2. Hypertension  3. Dyslipidemia  4. Diabetes mellitus  5. Morbid obesity  6. Bilateral cataracts  7. History of CHF  8. COPD with nocturnal oxygen  9. GERD/HH  10. History of diverticulitis  11. Surgeries:  a. Cholecystectomy  b. Total abdominal hysterectomy         Allergies   Allergen Reactions   • Levaquin [Levofloxacin]    • Rocephin [Ceftriaxone]          Current Outpatient Prescriptions:   •  albuterol (PROVENTIL HFA;VENTOLIN HFA) 108 (90 BASE) MCG/ACT inhaler, Inhale 2 puffs Every 4 (Four) Hours As Needed for Wheezing., Disp: 18 g, Rfl: 5  •  albuterol (PROVENTIL) (2.5 MG/3ML) 0.083% nebulizer solution, Take 2.5 mg by nebulization Every 4 (Four) Hours As Needed for Wheezing or Shortness of Air., Disp: , Rfl:   •  atenolol (TENORMIN) 50 MG tablet, Take 1 tablet by mouth Daily., Disp: 90 tablet, Rfl: 1  •  B-D ULTRAFINE III SHORT PEN 31G X 8 MM misc, USE WITH LANTUS TWICE DAILY, Disp: 100 each, Rfl: 4  •  diazePAM (VALIUM) 5 MG tablet, TAKE ONE TABLET BY MOUTH TWICE DAILY AS NEEDED, Disp: 30 tablet, Rfl: 0  •  FLUoxetine (PROzac) 40 MG capsule, Take 1 capsule by mouth Daily., Disp: 90 capsule, Rfl: 1  •  glucose blood (FREESTYLE LITE) test strip, Use as instructed, Disp: 100 each, Rfl: 12  •  hydrocortisone 2.5 % cream, Apply  topically 2 (Two) Times a Day., Disp: 453.6 g, Rfl: 0  •  LANTUS SOLOSTAR 100 UNIT/ML injection pen, Inject 25 Units under the skin 2 (Two) Times a Day., Disp: 3 pen, Rfl: 1  •  omeprazole (priLOSEC) 20 MG capsule, Take 1 capsule by mouth Daily., Disp: 90 capsule, Rfl: 1  •  pantoprazole (PROTONIX) 40 MG EC tablet, Take 1 tablet by mouth Daily., Disp: 30 tablet, Rfl: 5  •  promethazine (PHENERGAN) 25 MG tablet, Take 1  tablet by mouth Every 6 (Six) Hours As Needed for Nausea or Vomiting., Disp: 30 tablet, Rfl: 0  •  rosuvastatin (CRESTOR) 10 MG tablet, Take 1 tablet by mouth Every Night., Disp: 90 tablet, Rfl: 1        History of Present Illness    Patient is a 62-year-old  female who we are seeing today for further evaluation of chest pain.  Over the course of the last 6 months she has had some recurrent chest pain.  Some of this is described as a sharp sensation.  Other episodes are described as a pressure/tightness sensation.  This can occur with exertion and typically alleviates with rest.  It will last for just a few minutes and then resolved.  There is associated shortness of breath with this.  Denies any syncope.  Does note some positional dizziness as well as some occasional feet edema.  She is preop for bilateral cataract surgery.    The following portions of the patient's history were reviewed and updated as appropriate: allergies, current medications, past family history, past medical history, past social history, past surgical history and problem list.    Family History   Problem Relation Age of Onset   • Heart disease Mother    • Hyperlipidemia Mother    • Diabetes Mother    • Hypertension Mother    • Cancer Father    • Heart disease Father    • Colon cancer Father    • Cancer Brother    • Cancer Maternal Grandfather        Social History   Substance Use Topics   • Smoking status: Former Smoker     Quit date: 2015   • Smokeless tobacco: Never Used   • Alcohol use No         Review of Systems   Constitution: Negative for fever, weakness and malaise/fatigue.   HENT: Negative for nosebleeds.    Eyes: Negative for redness and visual disturbance.   Cardiovascular: Negative for orthopnea, palpitations and paroxysmal nocturnal dyspnea.   Respiratory: Positive for shortness of breath and sputum production. Negative for cough, snoring and wheezing.    Hematologic/Lymphatic: Negative for bleeding problem.   Skin:  "Negative for flushing, itching and rash.   Musculoskeletal: Negative for falls, joint pain and muscle cramps.   Gastrointestinal: Positive for abdominal pain, heartburn and hematochezia. Negative for diarrhea, nausea and vomiting.   Genitourinary: Negative for hematuria.   Neurological: Negative for excessive daytime sleepiness, dizziness, headaches and tremors.   Psychiatric/Behavioral: Negative for substance abuse. The patient is not nervous/anxious.           Objective:    height is 154.9 cm (61\") and weight is 157 kg (346 lb) (abnormal). Her blood pressure is 118/76 and her pulse is 59.         Physical Exam   Constitutional: She is oriented to person, place, and time. She appears well-developed and well-nourished.   HENT:   Head: Normocephalic and atraumatic.   Mouth/Throat: Oropharynx is clear and moist.   Eyes: Conjunctivae are normal. Pupils are equal, round, and reactive to light.   Neck: Normal carotid pulses and no JVD present. Carotid bruit is not present. No thyromegaly present.   Cardiovascular: Normal rate, regular rhythm, S1 normal and S2 normal.  Exam reveals no gallop and no friction rub.    No murmur heard.  Pulses:       Carotid pulses are 2+ on the right side, and 2+ on the left side.       Dorsalis pedis pulses are 2+ on the right side, and 2+ on the left side.        Posterior tibial pulses are 2+ on the right side, and 2+ on the left side.   Pulmonary/Chest: No respiratory distress. She has no wheezes. She has no rales. She exhibits no tenderness.   Abdominal: She exhibits no distension, no abdominal bruit and no mass. There is no hepatosplenomegaly. There is no tenderness. There is no rebound.   Musculoskeletal: She exhibits no edema, tenderness or deformity.   Lymphadenopathy:     She has no cervical adenopathy.   Neurological: She is alert and oriented to person, place, and time. She has normal strength.   Skin: Skin is warm and dry. No rash noted. No cyanosis. Nails show no clubbing. "   Psychiatric: She has a normal mood and affect. Cognition and memory are normal.       Procedures          Assessment:   Assessment/Plan      Gladys was seen today for chest pain and congestive heart failure.    Diagnoses and all orders for this visit:    Abnormal myocardial perfusion study    Angina pectoris    Essential hypertension    Dyslipidemia    Gastroesophageal reflux disease, esophagitis presence not specified      1.  New onset chest pain syndrome consistent with angina.  Patient at baseline functional class III-IV due to morbid obesity and significant COPD.  Abnormal myocardial perfusion study suggesting inferior and lateral ischemia, multivessel and high risk  3.  Dyslipidemia  4.  Hypertension controlled  5.  Significant COPD, oxygen dependent  6.  Morbid obesity  7.  GERD    Recommendations discussed the stress test with the patient today regarding options.  Given the high risk test, and multiple suboptimally controlled cardiac risk factors, would recommend left heart catheterization plus or minus PCI.  The patient is willing to proceed, after risk and benefits have been explained.  We'll defer further treatment of her GERD to primary care physician.  We have asked her start an aspirin 81 mg daily at this time.  We'll check a fasting lipid profile to his medical therapy as needed at the time of her heart catheterization.       Nhi IZAGUIRRE scribed portions of this dictation for  Dr. Lynn.  I, Quentin Lynn MD, personally performed the services described in this documentation as scribed by the above individual in my presence, and it is both accurate and complete    Dictated utilizing Dragon dictation

## 2017-12-21 DIAGNOSIS — R94.39 ABNORMAL STRESS TEST: Primary | ICD-10-CM

## 2017-12-21 DIAGNOSIS — I50.9 CHRONIC CONGESTIVE HEART FAILURE, UNSPECIFIED CONGESTIVE HEART FAILURE TYPE: ICD-10-CM

## 2017-12-26 ENCOUNTER — PREP FOR SURGERY (OUTPATIENT)
Dept: OTHER | Facility: HOSPITAL | Age: 62
End: 2017-12-26

## 2017-12-26 DIAGNOSIS — R94.39 ABNORMAL STRESS TEST: Primary | ICD-10-CM

## 2017-12-26 RX ORDER — ONDANSETRON 2 MG/ML
4 INJECTION INTRAMUSCULAR; INTRAVENOUS EVERY 6 HOURS PRN
Status: CANCELLED | OUTPATIENT
Start: 2017-12-26

## 2017-12-26 RX ORDER — NITROGLYCERIN 0.4 MG/1
0.4 TABLET SUBLINGUAL
Status: CANCELLED | OUTPATIENT
Start: 2017-12-26

## 2017-12-26 RX ORDER — ACETAMINOPHEN 325 MG/1
650 TABLET ORAL EVERY 4 HOURS PRN
Status: CANCELLED | OUTPATIENT
Start: 2017-12-26

## 2017-12-26 RX ORDER — ASPIRIN 325 MG
325 TABLET, DELAYED RELEASE (ENTERIC COATED) ORAL DAILY
Status: CANCELLED | OUTPATIENT
Start: 2017-12-27

## 2017-12-26 RX ORDER — ASPIRIN 325 MG
325 TABLET ORAL ONCE
Status: CANCELLED | OUTPATIENT
Start: 2017-12-26 | End: 2017-12-26

## 2017-12-26 RX ORDER — SODIUM CHLORIDE 0.9 % (FLUSH) 0.9 %
1-10 SYRINGE (ML) INJECTION AS NEEDED
Status: CANCELLED | OUTPATIENT
Start: 2017-12-26

## 2018-01-04 PROBLEM — R94.39 ABNORMAL STRESS TEST: Status: ACTIVE | Noted: 2018-01-04

## 2018-01-05 ENCOUNTER — HOSPITAL ENCOUNTER (OUTPATIENT)
Facility: HOSPITAL | Age: 63
Discharge: HOME | End: 2018-01-05
Attending: INTERNAL MEDICINE | Admitting: INTERNAL MEDICINE

## 2018-01-05 ENCOUNTER — APPOINTMENT (OUTPATIENT)
Dept: GENERAL RADIOLOGY | Facility: HOSPITAL | Age: 63
End: 2018-01-05

## 2018-01-05 VITALS
HEART RATE: 60 BPM | SYSTOLIC BLOOD PRESSURE: 162 MMHG | RESPIRATION RATE: 18 BRPM | WEIGHT: 293 LBS | OXYGEN SATURATION: 96 % | BODY MASS INDEX: 51.91 KG/M2 | TEMPERATURE: 97.7 F | DIASTOLIC BLOOD PRESSURE: 70 MMHG | HEIGHT: 63 IN

## 2018-01-05 DIAGNOSIS — R94.39 ABNORMAL STRESS TEST: ICD-10-CM

## 2018-01-05 DIAGNOSIS — I50.9 CHRONIC CONGESTIVE HEART FAILURE, UNSPECIFIED CONGESTIVE HEART FAILURE TYPE: ICD-10-CM

## 2018-01-05 LAB
ALBUMIN SERPL-MCNC: 4.1 G/DL (ref 3.2–4.8)
ALBUMIN/GLOB SERPL: 1.5 G/DL (ref 1.5–2.5)
ALP SERPL-CCNC: 97 U/L (ref 25–100)
ALT SERPL W P-5'-P-CCNC: 19 U/L (ref 7–40)
ANION GAP SERPL CALCULATED.3IONS-SCNC: 14 MMOL/L (ref 3–11)
ARTICHOKE IGE QN: 112 MG/DL (ref 0–130)
AST SERPL-CCNC: 27 U/L (ref 0–33)
BILIRUB SERPL-MCNC: 0.5 MG/DL (ref 0.3–1.2)
BUN BLD-MCNC: 31 MG/DL (ref 9–23)
BUN/CREAT SERPL: 20.7 (ref 7–25)
CALCIUM SPEC-SCNC: 9 MG/DL (ref 8.7–10.4)
CHLORIDE SERPL-SCNC: 104 MMOL/L (ref 99–109)
CHOLEST SERPL-MCNC: 176 MG/DL (ref 0–200)
CO2 SERPL-SCNC: 23 MMOL/L (ref 20–31)
CREAT BLD-MCNC: 1.5 MG/DL (ref 0.6–1.3)
DEPRECATED RDW RBC AUTO: 51.9 FL (ref 37–54)
ERYTHROCYTE [DISTWIDTH] IN BLOOD BY AUTOMATED COUNT: 18.3 % (ref 11.3–14.5)
GFR SERPL CREATININE-BSD FRML MDRD: 35 ML/MIN/1.73
GLOBULIN UR ELPH-MCNC: 2.8 GM/DL
GLUCOSE BLD-MCNC: 180 MG/DL (ref 70–100)
GLUCOSE BLDC GLUCOMTR-MCNC: 158 MG/DL (ref 70–130)
HBA1C MFR BLD: 7.5 % (ref 4.8–5.6)
HCT VFR BLD AUTO: 31.8 % (ref 34.5–44)
HDLC SERPL-MCNC: 46 MG/DL (ref 40–60)
HGB BLD-MCNC: 9.1 G/DL (ref 11.5–15.5)
MCH RBC QN AUTO: 22.4 PG (ref 27–31)
MCHC RBC AUTO-ENTMCNC: 28.6 G/DL (ref 32–36)
MCV RBC AUTO: 78.1 FL (ref 80–99)
PLATELET # BLD AUTO: 227 10*3/MM3 (ref 150–450)
PMV BLD AUTO: 11.1 FL (ref 6–12)
POTASSIUM BLD-SCNC: 4.4 MMOL/L (ref 3.5–5.5)
PROT SERPL-MCNC: 6.9 G/DL (ref 5.7–8.2)
RBC # BLD AUTO: 4.07 10*6/MM3 (ref 3.89–5.14)
SODIUM BLD-SCNC: 141 MMOL/L (ref 132–146)
TRIGL SERPL-MCNC: 177 MG/DL (ref 0–150)
WBC NRBC COR # BLD: 7.24 10*3/MM3 (ref 3.5–10.8)

## 2018-01-05 PROCEDURE — C1769 GUIDE WIRE: HCPCS | Performed by: INTERNAL MEDICINE

## 2018-01-05 PROCEDURE — 83036 HEMOGLOBIN GLYCOSYLATED A1C: CPT | Performed by: NURSE PRACTITIONER

## 2018-01-05 PROCEDURE — 93458 L HRT ARTERY/VENTRICLE ANGIO: CPT | Performed by: INTERNAL MEDICINE

## 2018-01-05 PROCEDURE — G0108 DIAB MANAGE TRN  PER INDIV: HCPCS | Performed by: REGISTERED NURSE

## 2018-01-05 PROCEDURE — 63710000001 ASPIRIN 325 MG TABLET: Performed by: NURSE PRACTITIONER

## 2018-01-05 PROCEDURE — 36415 COLL VENOUS BLD VENIPUNCTURE: CPT

## 2018-01-05 PROCEDURE — 25010000002 HEPARIN (PORCINE) PER 1000 UNITS: Performed by: INTERNAL MEDICINE

## 2018-01-05 PROCEDURE — 25010000002 MIDAZOLAM PER 1 MG: Performed by: INTERNAL MEDICINE

## 2018-01-05 PROCEDURE — 80061 LIPID PANEL: CPT | Performed by: NURSE PRACTITIONER

## 2018-01-05 PROCEDURE — 0 IOPAMIDOL PER 1 ML: Performed by: INTERNAL MEDICINE

## 2018-01-05 PROCEDURE — 25010000002 FENTANYL CITRATE (PF) 100 MCG/2ML SOLUTION: Performed by: INTERNAL MEDICINE

## 2018-01-05 PROCEDURE — A9270 NON-COVERED ITEM OR SERVICE: HCPCS | Performed by: NURSE PRACTITIONER

## 2018-01-05 PROCEDURE — 85027 COMPLETE CBC AUTOMATED: CPT | Performed by: NURSE PRACTITIONER

## 2018-01-05 PROCEDURE — 82962 GLUCOSE BLOOD TEST: CPT

## 2018-01-05 PROCEDURE — 71045 X-RAY EXAM CHEST 1 VIEW: CPT

## 2018-01-05 PROCEDURE — 80053 COMPREHEN METABOLIC PANEL: CPT | Performed by: NURSE PRACTITIONER

## 2018-01-05 PROCEDURE — C1894 INTRO/SHEATH, NON-LASER: HCPCS | Performed by: INTERNAL MEDICINE

## 2018-01-05 RX ORDER — ACETAMINOPHEN 325 MG/1
650 TABLET ORAL EVERY 4 HOURS PRN
Status: DISCONTINUED | OUTPATIENT
Start: 2018-01-05 | End: 2018-01-05 | Stop reason: HOSPADM

## 2018-01-05 RX ORDER — NITROGLYCERIN 0.4 MG/1
0.4 TABLET SUBLINGUAL
Status: DISCONTINUED | OUTPATIENT
Start: 2018-01-05 | End: 2018-01-05 | Stop reason: HOSPADM

## 2018-01-05 RX ORDER — MIDAZOLAM HYDROCHLORIDE 1 MG/ML
INJECTION INTRAMUSCULAR; INTRAVENOUS AS NEEDED
Status: DISCONTINUED | OUTPATIENT
Start: 2018-01-05 | End: 2018-01-05 | Stop reason: HOSPADM

## 2018-01-05 RX ORDER — ONDANSETRON 2 MG/ML
4 INJECTION INTRAMUSCULAR; INTRAVENOUS EVERY 6 HOURS PRN
Status: DISCONTINUED | OUTPATIENT
Start: 2018-01-05 | End: 2018-01-05 | Stop reason: HOSPADM

## 2018-01-05 RX ORDER — ASPIRIN 325 MG
325 TABLET, DELAYED RELEASE (ENTERIC COATED) ORAL DAILY
Status: DISCONTINUED | OUTPATIENT
Start: 2018-01-06 | End: 2018-01-05 | Stop reason: HOSPADM

## 2018-01-05 RX ORDER — LIDOCAINE HYDROCHLORIDE 10 MG/ML
INJECTION, SOLUTION EPIDURAL; INFILTRATION; INTRACAUDAL; PERINEURAL AS NEEDED
Status: DISCONTINUED | OUTPATIENT
Start: 2018-01-05 | End: 2018-01-05 | Stop reason: HOSPADM

## 2018-01-05 RX ORDER — HYDROCODONE BITARTRATE AND ACETAMINOPHEN 5; 325 MG/1; MG/1
1 TABLET ORAL EVERY 4 HOURS PRN
Status: DISCONTINUED | OUTPATIENT
Start: 2018-01-05 | End: 2018-01-05 | Stop reason: HOSPADM

## 2018-01-05 RX ORDER — SODIUM CHLORIDE 9 MG/ML
1-3 INJECTION, SOLUTION INTRAVENOUS CONTINUOUS
Status: DISCONTINUED | OUTPATIENT
Start: 2018-01-05 | End: 2018-01-05 | Stop reason: HOSPADM

## 2018-01-05 RX ORDER — ASPIRIN 325 MG
325 TABLET ORAL ONCE
Status: COMPLETED | OUTPATIENT
Start: 2018-01-05 | End: 2018-01-05

## 2018-01-05 RX ORDER — DIPHENHYDRAMINE HYDROCHLORIDE 50 MG/ML
25 INJECTION INTRAMUSCULAR; INTRAVENOUS EVERY 6 HOURS PRN
Status: DISCONTINUED | OUTPATIENT
Start: 2018-01-05 | End: 2018-01-05 | Stop reason: HOSPADM

## 2018-01-05 RX ORDER — FENTANYL CITRATE 50 UG/ML
INJECTION, SOLUTION INTRAMUSCULAR; INTRAVENOUS AS NEEDED
Status: DISCONTINUED | OUTPATIENT
Start: 2018-01-05 | End: 2018-01-05 | Stop reason: HOSPADM

## 2018-01-05 RX ORDER — SODIUM CHLORIDE 0.9 % (FLUSH) 0.9 %
1-10 SYRINGE (ML) INJECTION AS NEEDED
Status: DISCONTINUED | OUTPATIENT
Start: 2018-01-05 | End: 2018-01-05 | Stop reason: HOSPADM

## 2018-01-05 RX ORDER — ASPIRIN 81 MG/1
81 TABLET ORAL DAILY
COMMUNITY

## 2018-01-05 RX ORDER — FUROSEMIDE 40 MG/1
40 TABLET ORAL DAILY PRN
COMMUNITY

## 2018-01-05 RX ADMIN — ASPIRIN 325 MG ORAL TABLET 325 MG: 325 PILL ORAL at 10:04

## 2018-01-05 NOTE — PLAN OF CARE
Problem: Patient Care Overview (Adult)  Goal: Plan of Care Review  Outcome: Ongoing (interventions implemented as appropriate)   01/05/18 1327   Coping/Psychosocial Response Interventions   Plan Of Care Reviewed With patient;family   Patient Care Overview   Progress improving   Outcome Evaluation   Outcome Summary/Follow up Plan PT AND FAMILY ABLE TO VERBALIZE UNDERSTANDING OF DC INSTRUCTIONS- NO QUESTIONS AT THIS TIME- SITE STABLE AND AMBULATING WITH NO ISSUES.     Goal: Discharge Needs Assessment  Outcome: Ongoing (interventions implemented as appropriate)   01/05/18 1327   Discharge Needs Assessment   Concerns To Be Addressed no discharge needs identified       Problem: Cardiac Catheterization with/without PCI (Adult)  Goal: Signs and Symptoms of Listed Potential Problems Will be Absent or Manageable (Cardiac Catheterization with/without PCI)  Outcome: Ongoing (interventions implemented as appropriate)   01/05/18 1327   Cardiac Catheterization with/without PCI   Problems Assessed (Cardiac Catheterization) all   Problems Present (Cardiac Catheterization) none

## 2018-01-05 NOTE — CONSULTS
"Diabetes Education  Assessment/Teaching    Patient Name:  Gladys Cabrales  YOB: 1955  MRN: [id number]  Admit Date:  1/5/2018      Assessment Date:  1/5/2018       Most Recent Value    General Information      Referral From:  A1c, Blood glucose, MD order    Height  160 cm (63\")    Height Method  Stated    Weight  (!)  156 kg (343 lb 4.1 oz)    Pregnancy Assessment     Diabetes History     What type of diabetes do you have?  Type 2    Length of Diabetes Diagnosis  10 + years    Current DM knowledge  fair    Have you had diabetes education/teaching in the past?  no    Do you test your blood sugar at home?  yes    Frequency of checks  Twice daily    Meter type  Freestyle Lite    Who performs the test?  Self    Typical readings  150 in AM    Have you had low blood sugar? (<70mg/dl)  no    Have you had high blood sugar? (>140mg/dl)  yes    How often do you have high blood sugar?  occasionally    Education Preferences     Nutrition Information     Assessment Topics     Healthy Eating - Assessment  Needs education    Being Active - Assessment  Needs education    Reducing Risk - Assessment  Needs education    DM Goals     Contact Plan  -- [FU with PCP]               Most Recent Value    DM Education Needs     Meter  Has own    Meter Type  Freestyle    Frequency of Testing  4 times a day    Blood Glucose Target Range  80 to 130    Medication  Insulin, Actions, Side effects    Problem Solving  Hypoglycemia, Hyperglycemia, Sick days, Signs, Symptoms, Treatment    Reducing Risks  A1C testing, Immunizations    Physical Activity  None    Physical Activity Frequency  Never    Healthy Coping  Appropriate    Motivation  Moderate    Teaching Method  Explanation, Discussion, Handouts, Teach back    Patient Response  Verbalized understanding, Needs reinforcement        Ms. Cabrales states she has been a Type 2 diabetic for 10+ years. She states she takes Lantus BID. She checks her BG levels twice daily at home. Her current A1c " is 7.5%. Discussed her taking her BG more often for better control.    Discussed and taught patient about type 2 diabetes self-management, risk factors, and importance of blood glucose control to reduce complications. Target blood glucose readings and A1c goals per ADA were reviewed. Reviewed with patient current A1c and discussed its significance. Signs, symptoms and treatment of hyperglycemia and hypoglycemia were discussed. Lifestyle changes such as physical activity with MD approval and healthy eating were encouraged. Pt instructed to  check blood sugar 4 times per day and to call PCP if  Blood glucose is higher than 180 two times or more.  Patient was encouraged to keep record of blood glucose readings to take to follow up appointment with PCP.          Electronically signed by:  Naina Hopson RN  01/05/18 12:53 PM

## 2018-01-05 NOTE — PLAN OF CARE
Problem: Cardiac Catheterization with/without PCI (Adult)  Goal: Signs and Symptoms of Listed Potential Problems Will be Absent or Manageable (Cardiac Catheterization with/without PCI)  Outcome: Ongoing (interventions implemented as appropriate)   01/05/18 1021   Cardiac Catheterization with/without PCI   Problems Assessed (Cardiac Catheterization) all   Problems Present (Cardiac Catheterization) none

## 2018-01-08 ENCOUNTER — TELEPHONE (OUTPATIENT)
Dept: CARDIOLOGY | Facility: CLINIC | Age: 63
End: 2018-01-08

## 2018-01-08 NOTE — TELEPHONE ENCOUNTER
Talked with her daughter who will take her to have a BMP done and make sure that we get the results.

## 2018-01-08 NOTE — TELEPHONE ENCOUNTER
Received a call regarding Gladys Cabrales from her dtr.   Says that the patient just had a cath on Friday and is to have a procedure on her kidneys on Wednesday where they would use dye.   Wonders if it is ok this soon after the heart cath.   Creatinine was 1.5 on Friday.  Not sure whether that was before or after the heart cath.

## 2018-02-06 RX ORDER — ATENOLOL 50 MG/1
TABLET ORAL
Qty: 90 TABLET | Refills: 1 | Status: SHIPPED | OUTPATIENT
Start: 2018-02-06 | End: 2018-08-08 | Stop reason: SDUPTHER

## 2018-02-15 RX ORDER — INSULIN GLARGINE 100 [IU]/ML
INJECTION, SOLUTION SUBCUTANEOUS
Qty: 45 ML | Refills: 1 | Status: SHIPPED | OUTPATIENT
Start: 2018-02-15

## 2018-02-15 RX ORDER — FLUOXETINE HYDROCHLORIDE 40 MG/1
CAPSULE ORAL
Qty: 90 CAPSULE | Refills: 1 | Status: SHIPPED | OUTPATIENT
Start: 2018-02-15 | End: 2018-08-27 | Stop reason: SDUPTHER

## 2018-03-05 ENCOUNTER — OFFICE VISIT (OUTPATIENT)
Dept: FAMILY MEDICINE CLINIC | Facility: CLINIC | Age: 63
End: 2018-03-05

## 2018-03-05 VITALS
HEART RATE: 60 BPM | WEIGHT: 293 LBS | SYSTOLIC BLOOD PRESSURE: 110 MMHG | BODY MASS INDEX: 51.91 KG/M2 | OXYGEN SATURATION: 98 % | TEMPERATURE: 97.3 F | RESPIRATION RATE: 24 BRPM | DIASTOLIC BLOOD PRESSURE: 60 MMHG | HEIGHT: 63 IN

## 2018-03-05 DIAGNOSIS — K21.9 GASTROESOPHAGEAL REFLUX DISEASE, ESOPHAGITIS PRESENCE NOT SPECIFIED: ICD-10-CM

## 2018-03-05 DIAGNOSIS — I50.9 CHRONIC CONGESTIVE HEART FAILURE, UNSPECIFIED CONGESTIVE HEART FAILURE TYPE: ICD-10-CM

## 2018-03-05 DIAGNOSIS — Z01.818 PRE-OP EXAMINATION: Primary | ICD-10-CM

## 2018-03-05 DIAGNOSIS — E78.5 DYSLIPIDEMIA: ICD-10-CM

## 2018-03-05 DIAGNOSIS — E66.01 MORBID OBESITY (HCC): ICD-10-CM

## 2018-03-05 DIAGNOSIS — K62.5 BRBPR (BRIGHT RED BLOOD PER RECTUM): ICD-10-CM

## 2018-03-05 DIAGNOSIS — N18.4 CHRONIC RENAL IMPAIRMENT, STAGE 4 (SEVERE) (HCC): ICD-10-CM

## 2018-03-05 DIAGNOSIS — J42 CHRONIC BRONCHITIS, UNSPECIFIED CHRONIC BRONCHITIS TYPE (HCC): ICD-10-CM

## 2018-03-05 DIAGNOSIS — Z79.4 TYPE 2 DIABETES MELLITUS WITH HYPERGLYCEMIA, WITH LONG-TERM CURRENT USE OF INSULIN (HCC): ICD-10-CM

## 2018-03-05 DIAGNOSIS — I10 ESSENTIAL HYPERTENSION: ICD-10-CM

## 2018-03-05 DIAGNOSIS — Z01.810 PREOP CARDIOVASCULAR EXAM: ICD-10-CM

## 2018-03-05 DIAGNOSIS — G47.33 OBSTRUCTIVE SLEEP APNEA SYNDROME: ICD-10-CM

## 2018-03-05 DIAGNOSIS — D50.0 IRON DEFICIENCY ANEMIA DUE TO CHRONIC BLOOD LOSS: ICD-10-CM

## 2018-03-05 DIAGNOSIS — E11.65 TYPE 2 DIABETES MELLITUS WITH HYPERGLYCEMIA, WITH LONG-TERM CURRENT USE OF INSULIN (HCC): ICD-10-CM

## 2018-03-05 LAB
ALBUMIN SERPL-MCNC: 4.1 G/DL (ref 3.2–4.8)
ALBUMIN/GLOB SERPL: 1.4 G/DL (ref 1.5–2.5)
ALP SERPL-CCNC: 108 U/L (ref 25–100)
ALT SERPL-CCNC: 23 U/L (ref 7–40)
AST SERPL-CCNC: 30 U/L (ref 0–33)
BASOPHILS # BLD AUTO: 0.05 10*3/MM3 (ref 0–0.2)
BASOPHILS NFR BLD AUTO: 0.8 % (ref 0–1)
BILIRUB SERPL-MCNC: 0.2 MG/DL (ref 0.3–1.2)
BUN SERPL-MCNC: 31 MG/DL (ref 9–23)
BUN/CREAT SERPL: 19.4 (ref 7–25)
CALCIUM SERPL-MCNC: 9.3 MG/DL (ref 8.7–10.4)
CHLORIDE SERPL-SCNC: 109 MMOL/L (ref 99–109)
CHOLEST SERPL-MCNC: 284 MG/DL (ref 0–200)
CO2 SERPL-SCNC: 23 MMOL/L (ref 20–31)
CREAT SERPL-MCNC: 1.6 MG/DL (ref 0.6–1.3)
EOSINOPHIL # BLD AUTO: 0.22 10*3/MM3 (ref 0–0.3)
EOSINOPHIL NFR BLD AUTO: 3.3 % (ref 0–3)
ERYTHROCYTE [DISTWIDTH] IN BLOOD BY AUTOMATED COUNT: 19.1 % (ref 11.3–14.5)
GFR SERPLBLD CREATININE-BSD FMLA CKD-EPI: 32 ML/MIN/1.73
GFR SERPLBLD CREATININE-BSD FMLA CKD-EPI: 39 ML/MIN/1.73
GLOBULIN SER CALC-MCNC: 2.9 GM/DL
GLUCOSE SERPL-MCNC: 198 MG/DL (ref 70–100)
HBA1C MFR BLD: 7.5 % (ref 4.8–5.6)
HCT VFR BLD AUTO: 36 % (ref 34.5–44)
HDLC SERPL-MCNC: 58 MG/DL (ref 40–60)
HGB BLD-MCNC: 10 G/DL (ref 11.5–15.5)
IMM GRANULOCYTES # BLD: 0.01 10*3/MM3 (ref 0–0.03)
IMM GRANULOCYTES NFR BLD: 0.2 % (ref 0–0.6)
IRON SATN MFR SERPL: 7 % (ref 15–50)
IRON SERPL-MCNC: 26 MCG/DL (ref 50–175)
LDLC SERPL CALC-MCNC: 174 MG/DL (ref 0–100)
LYMPHOCYTES # BLD AUTO: 2.63 10*3/MM3 (ref 0.6–4.8)
LYMPHOCYTES NFR BLD AUTO: 39.5 % (ref 24–44)
MCH RBC QN AUTO: 22.4 PG (ref 27–31)
MCHC RBC AUTO-ENTMCNC: 27.8 G/DL (ref 32–36)
MCV RBC AUTO: 80.7 FL (ref 80–99)
MONOCYTES # BLD AUTO: 0.5 10*3/MM3 (ref 0–1)
MONOCYTES NFR BLD AUTO: 7.5 % (ref 0–12)
NEUTROPHILS # BLD AUTO: 3.25 10*3/MM3 (ref 1.5–8.3)
NEUTROPHILS NFR BLD AUTO: 48.7 % (ref 41–71)
PLATELET # BLD AUTO: 259 10*3/MM3 (ref 150–450)
POTASSIUM SERPL-SCNC: 5.2 MMOL/L (ref 3.5–5.5)
PROT SERPL-MCNC: 7 G/DL (ref 5.7–8.2)
RBC # BLD AUTO: 4.46 10*6/MM3 (ref 3.89–5.14)
SODIUM SERPL-SCNC: 140 MMOL/L (ref 132–146)
TIBC SERPL-MCNC: 392 MCG/DL (ref 250–450)
TRIGL SERPL-MCNC: 258 MG/DL (ref 0–150)
UIBC SERPL-MCNC: 366 MCG/DL
VLDLC SERPL CALC-MCNC: 51.6 MG/DL
WBC # BLD AUTO: 6.66 10*3/MM3 (ref 3.5–10.8)

## 2018-03-05 PROCEDURE — 93000 ELECTROCARDIOGRAM COMPLETE: CPT | Performed by: PHYSICIAN ASSISTANT

## 2018-03-05 PROCEDURE — 99214 OFFICE O/P EST MOD 30 MIN: CPT | Performed by: PHYSICIAN ASSISTANT

## 2018-03-05 NOTE — PROGRESS NOTES
Subjective   Gladys Cabrales is a 63 y.o. female  who presents to the office today for a preoperative consultation at the request of surgeon Dr. Mayfield who plans on performing R  And L cataract surgery  on March 13 and March 27, respectively.  This consultation is requested for the specific conditions prompting preoperative evaluation (i.e. because of potential affect on operative risk): New onset chest pain syndrome consistent with angina.  Patient at baseline functional class III-IV due to morbid obesity and significant COPD.Abnormal myocardial perfusion study suggesting inferior and lateral ischemia, multivessel and high risk, Dyslipidemia, Hypertension controlled, Significant COPD, oxygen dependent, Morbid obesity, GERD, DM- insulin dependent, renal dysfunction. Planned anesthesia: local. The patient has the following known anesthesia issues: past general anesthesia with complications (per patient difficulty coming out of anesthesia, had to be on life support for a time) Last general anesthesia was 4 years ago after Gallbladder surgery.  Patients bleeding risk: recent abnormal bleeding (reports frequent BRBPR). Patient does not have objections to receiving blood products if needed.    Had initial pre-op 11/14/17 for bilateral cataract surgery with ZINA Taylor. At that time patient reported frequent chest pain. Was encouraged to have cardiac evaluation prior to surgical approval. During evaluation, patient had abnormal cardiac perfusion study and proceeded with cardiac cath. No blockages reported. Will attach findings with pre-op note.     Chronic GI issues. States she has BRBPR almost daily. Has had colonoscopy in the past. Last evaluation was 4 years ago. Was told she needed colonoscopy every 2 years. Was having so much stomach cramping and upset she has not done this. States she is ready to follow through with GI consult. Reports hx of iron deficiency. Can not tolerate oral supplementation (GI s/e) has had  infusions in the past. Also suspected stomach ulcer hx in the past    Morbid obesity. Is interested in bariatric consult at this time. Current BMI 61.6%    Pt using 2 L 02 via nasal cannula at bedtime. This was started in the hospital quite some time ago. No f/u. Told she has sleep apnea in the past. Never had CPAP. Would like to pursue sleep study. Still dyspnea and exhaustion on exertion      The following portions of the patient's history were reviewed and updated as appropriate: allergies, current medications, past family history, past medical history, past social history, past surgical history and problem list.    Review of Systems  Review of Systems   Constitutional: Positive for fatigue and unexpected weight change. Negative for chills, diaphoresis and fever.   HENT: Negative.  Negative for congestion, ear discharge, ear pain, hearing loss, nosebleeds, postnasal drip, sinus pressure, sneezing and sore throat.    Eyes: Positive for visual disturbance.   Respiratory: Negative.  Negative for cough, chest tightness and wheezing.         Shortness of breath with exertion    Cardiovascular: Negative.  Negative for chest pain, palpitations and leg swelling.   Gastrointestinal: Positive for abdominal pain, anal bleeding and blood in stool. Negative for abdominal distention, constipation, diarrhea, nausea, rectal pain and vomiting.        Heartburn    Endocrine: Negative.  Negative for cold intolerance, heat intolerance, polydipsia, polyphagia and polyuria.   Genitourinary: Negative.  Negative for difficulty urinating, dysuria, flank pain, frequency, hematuria and urgency.   Musculoskeletal: Positive for arthralgias. Negative for back pain, gait problem, joint swelling, myalgias, neck pain and neck stiffness.   Skin: Negative.  Negative for color change, pallor, rash and wound.   Allergic/Immunologic: Negative.  Negative for immunocompromised state.   Neurological: Negative for dizziness, syncope, weakness,  "light-headedness, numbness and headaches.   Hematological: Negative.  Negative for adenopathy. Does not bruise/bleed easily.   Psychiatric/Behavioral: Positive for sleep disturbance. Negative for behavioral problems, confusion, self-injury and suicidal ideas. The patient is not nervous/anxious.        Objective   Blood pressure 110/60, pulse 60, temperature 97.3 °F (36.3 °C), resp. rate 24, height 160 cm (63\"), weight (!) 158 kg (347 lb 12.8 oz), SpO2 98 %.     Physical Exam   Constitutional: She is oriented to person, place, and time. She appears well-developed and well-nourished.   Obese    HENT:   Head: Normocephalic and atraumatic.   Right Ear: Tympanic membrane, external ear and ear canal normal.   Left Ear: Tympanic membrane, external ear and ear canal normal.   Nose: Nose normal.   Mouth/Throat: Oropharynx is clear and moist. No oropharyngeal exudate.   Eyes: Conjunctivae and EOM are normal. Pupils are equal, round, and reactive to light.   Neck: Normal range of motion. Neck supple. No tracheal deviation present. No thyromegaly present.   Cardiovascular: Normal rate, regular rhythm, normal heart sounds and intact distal pulses.  Exam reveals no gallop and no friction rub.    No murmur heard.  2 + pitting edema of LE bilaterally    Pulmonary/Chest: Effort normal and breath sounds normal. No respiratory distress. She has no wheezes. She has no rales. She exhibits no tenderness.   Abdominal: Soft. Bowel sounds are normal. She exhibits no distension and no mass. There is no tenderness. There is no rebound and no guarding. No hernia.   Musculoskeletal: Normal range of motion. She exhibits no edema, tenderness or deformity.   Lymphadenopathy:     She has no cervical adenopathy.   Neurological: She is alert and oriented to person, place, and time. She has normal reflexes.   Skin: Skin is warm and dry.   Psychiatric: She has a normal mood and affect. Her behavior is normal. Judgment and thought content normal. "   Nursing note and vitals reviewed.      Cardiographics    ECG 12 Lead  Date/Time: 3/5/2018 10:31 AM  Performed by: FRAN TEJEDA  Authorized by: ANKIT PERALES   Comparison: compared with previous ECG from 11/14/2017  Similar to previous ECG  Rhythm: sinus bradycardia  Rate: bradycardic  Conduction: conduction normal  T Waves: T waves normal  Clinical impression: abnormal ECG  Comments: Evidence of possible old anterior infarct. No acute changes. EKG stable compared to previous EKG from 11/2017          Lab Review   See attached     Assessment/Plan   Gladys Cabrales is a 63 y.o. female with planned surgery as above.    Known risk factors for perioperative complications: Anemia  Diabetes mellitus  Renal dysfunction      Cardiac Risk Estimation: moderate to high risk     Current medications which may produce withdrawal symptoms if withheld perioperatively: N/A    1. Preoperative workup as follows cardiac stress testing to exclude undiagnosed coronary disease,, echocardiography to exclude impaired ventricular function, ECG, hemoglobin, hematocrit, electrolytes, creatinine, glucose, liver function studies.  2. Change in medication regimen before surgery: per surgeon.  3. Prophylaxis for cardiac events with perioperative beta-blockers: should be considered, specific regimen per anesthesia.  4. Invasive hemodynamic monitoring perioperatively: at the discretion of anesthesiologist.  5. Deep vein thrombosis prophylaxis postoperatively:regimen to be chosen by surgical team.  6. Surveillance for postoperative MI with ECG immediately postoperatively and on postoperative days 1 and 2 AND troponin levels 24 hours postoperatively and on day 4 or hospital discharge (whichever comes first): at the discretion of anesthesiologist.Gladys was seen today for pre-op exam.    Diagnoses and all orders for this visit:    Pre-op examination  -     CBC & Differential  -     Comprehensive Metabolic Panel  -     Lipid Panel  -      Hemoglobin A1c  -     Iron Profile    Preop cardiovascular exam  -     ECG 12 Lead    BRBPR (bright red blood per rectum)  -     Ambulatory Referral to Gastroenterology    Chronic renal impairment, stage 4 (severe)  -     Comprehensive Metabolic Panel    Type 2 diabetes mellitus with hyperglycemia, with long-term current use of insulin  -     Hemoglobin A1c    Iron deficiency anemia due to chronic blood loss  -     CBC & Differential  -     Iron Profile  -     Ambulatory Referral to Gastroenterology    Dyslipidemia  -     Lipid Panel    Essential hypertension    Chronic congestive heart failure, unspecified congestive heart failure type    Chronic bronchitis, unspecified chronic bronchitis type    Gastroesophageal reflux disease, esophagitis presence not specified    Obstructive sleep apnea syndrome  -     Ambulatory Referral to Sleep Medicine    Morbid obesity  -     Ambulatory Referral to Bariatric Surgery        Discussion     Multiple medical concerns that may affect outcome of surgery including HTN, DM, chronic iron def anemia, hx of CHF, Hx of renal disease. Will refer on to GI, sleep medicine and bariatric as outlined in plan. Cardiac clearance approved following evaluation from Dr. Lynn. Will check labs per Dr. Mayfield's request prior to cataract surgery     Valdemar Lagos PA-C

## 2018-03-07 DIAGNOSIS — E61.1 IRON DEFICIENCY: Primary | ICD-10-CM

## 2018-03-19 ENCOUNTER — TELEPHONE (OUTPATIENT)
Dept: FAMILY MEDICINE CLINIC | Facility: CLINIC | Age: 63
End: 2018-03-19

## 2018-03-19 ENCOUNTER — LAB (OUTPATIENT)
Dept: LAB | Facility: HOSPITAL | Age: 63
End: 2018-03-19

## 2018-03-19 ENCOUNTER — OFFICE VISIT (OUTPATIENT)
Dept: FAMILY MEDICINE CLINIC | Facility: CLINIC | Age: 63
End: 2018-03-19

## 2018-03-19 ENCOUNTER — CONSULT (OUTPATIENT)
Dept: ONCOLOGY | Facility: CLINIC | Age: 63
End: 2018-03-19

## 2018-03-19 VITALS
RESPIRATION RATE: 26 BRPM | SYSTOLIC BLOOD PRESSURE: 179 MMHG | HEART RATE: 58 BPM | DIASTOLIC BLOOD PRESSURE: 54 MMHG | WEIGHT: 293 LBS | TEMPERATURE: 98 F | HEIGHT: 63 IN | OXYGEN SATURATION: 94 % | BODY MASS INDEX: 51.91 KG/M2

## 2018-03-19 VITALS
BODY MASS INDEX: 51.91 KG/M2 | HEART RATE: 55 BPM | HEIGHT: 63 IN | RESPIRATION RATE: 20 BRPM | DIASTOLIC BLOOD PRESSURE: 60 MMHG | WEIGHT: 293 LBS | TEMPERATURE: 96.7 F | SYSTOLIC BLOOD PRESSURE: 90 MMHG

## 2018-03-19 DIAGNOSIS — R10.11 RUQ PAIN: Primary | ICD-10-CM

## 2018-03-19 DIAGNOSIS — R11.0 NAUSEA: ICD-10-CM

## 2018-03-19 DIAGNOSIS — R19.7 DIARRHEA, UNSPECIFIED TYPE: ICD-10-CM

## 2018-03-19 DIAGNOSIS — R10.9 RIGHT FLANK PAIN: ICD-10-CM

## 2018-03-19 DIAGNOSIS — D50.9 IRON DEFICIENCY ANEMIA, UNSPECIFIED IRON DEFICIENCY ANEMIA TYPE: ICD-10-CM

## 2018-03-19 DIAGNOSIS — D50.9 IRON DEFICIENCY ANEMIA, UNSPECIFIED IRON DEFICIENCY ANEMIA TYPE: Primary | ICD-10-CM

## 2018-03-19 LAB
BILIRUB BLD-MCNC: NEGATIVE MG/DL
CLARITY, POC: CLEAR
COLOR UR: YELLOW
ERYTHROCYTE [DISTWIDTH] IN BLOOD BY AUTOMATED COUNT: 20.4 % (ref 11.3–14.5)
GLUCOSE UR STRIP-MCNC: NEGATIVE MG/DL
HCT VFR BLD AUTO: 32.9 % (ref 34.5–44)
HGB BLD-MCNC: 9.6 G/DL (ref 11.5–15.5)
IRON 24H UR-MRATE: 41 MCG/DL (ref 50–175)
IRON SATN MFR SERPL: 11 % (ref 15–50)
KETONES UR QL: NEGATIVE
LEUKOCYTE EST, POC: ABNORMAL
LYMPHOCYTES # BLD AUTO: 3.1 10*3/MM3 (ref 0.6–4.8)
LYMPHOCYTES NFR BLD AUTO: 37.5 % (ref 24–44)
MCH RBC QN AUTO: 22.1 PG (ref 27–31)
MCHC RBC AUTO-ENTMCNC: 29.1 G/DL (ref 32–36)
MCV RBC AUTO: 75.8 FL (ref 80–99)
MONOCYTES # BLD AUTO: 0.5 10*3/MM3 (ref 0–1)
MONOCYTES NFR BLD AUTO: 6.4 % (ref 0–12)
NEUTROPHILS # BLD AUTO: 4.6 10*3/MM3 (ref 1.5–8.3)
NEUTROPHILS NFR BLD AUTO: 56.1 % (ref 41–71)
NITRITE UR-MCNC: NEGATIVE MG/ML
PH UR: 5 [PH] (ref 5–8)
PLATELET # BLD AUTO: 226 10*3/MM3 (ref 150–450)
PMV BLD AUTO: 9.4 FL (ref 6–12)
PROT UR STRIP-MCNC: NEGATIVE MG/DL
RBC # BLD AUTO: 4.34 10*6/MM3 (ref 3.89–5.14)
RBC # UR STRIP: NEGATIVE /UL
SP GR UR: 1.01 (ref 1–1.03)
TIBC SERPL-MCNC: 386 MCG/DL (ref 250–450)
UROBILINOGEN UR QL: NORMAL
WBC NRBC COR # BLD: 8.2 10*3/MM3 (ref 3.5–10.8)

## 2018-03-19 PROCEDURE — 83550 IRON BINDING TEST: CPT

## 2018-03-19 PROCEDURE — 99214 OFFICE O/P EST MOD 30 MIN: CPT | Performed by: PHYSICIAN ASSISTANT

## 2018-03-19 PROCEDURE — 81003 URINALYSIS AUTO W/O SCOPE: CPT | Performed by: PHYSICIAN ASSISTANT

## 2018-03-19 PROCEDURE — 83540 ASSAY OF IRON: CPT

## 2018-03-19 PROCEDURE — 82728 ASSAY OF FERRITIN: CPT

## 2018-03-19 PROCEDURE — 36415 COLL VENOUS BLD VENIPUNCTURE: CPT

## 2018-03-19 PROCEDURE — 85025 COMPLETE CBC W/AUTO DIFF WBC: CPT

## 2018-03-19 PROCEDURE — 99204 OFFICE O/P NEW MOD 45 MIN: CPT | Performed by: INTERNAL MEDICINE

## 2018-03-19 RX ORDER — DIAZEPAM 5 MG/1
TABLET ORAL
Qty: 30 TABLET | Refills: 0 | Status: SHIPPED | OUTPATIENT
Start: 2018-03-19

## 2018-03-19 NOTE — PROGRESS NOTES
Subjective   Gladys Cabrales is a 63 y.o. female.     History of Present Illness   Miscommunication about why patient was being seen today. Daughter thought she needed to be seen in our office, however she was suppose to f/u with cardiology  While here patient has concerns of RUQ pain daily for the last several months. Radiates to back. Pt has had gallbladder removed. HX of BRBPR along with iron def for quite sometime. Pt just recently agreed to f/u with GI for this (will do colonoscopy and likely EGD based on additional symptoms). Saw hematology today. They are going to try her on oral iron again (pt told me she could not tolerate this in the past but will try again as hematology does not want to do an infusion yet).   Feels discomfort more when having diarrhea (has been having alternating BMs)   No fever, still passing stool  Always feels bloated and upper abdominal discomfort   Denies urinary symptoms   Had recent labs   Requesting refill on Valium, gets very anxious coming to the doctor's. Will not sleep the night before appts.     The following portions of the patient's history were reviewed and updated as appropriate: allergies, current medications, past family history, past medical history, past social history, past surgical history and problem list.    Review of Systems  Review of Systems   Constitutional: Positive for fatigue and unexpected weight change. Negative for chills, diaphoresis and fever.   HENT: Negative.  Negative for congestion, ear discharge, ear pain, hearing loss, nosebleeds, postnasal drip, sinus pressure, sneezing and sore throat.    Eyes: Positive for visual disturbance.   Respiratory: Negative.  Negative for cough, chest tightness and wheezing.         Shortness of breath with exertion    Cardiovascular: Negative.  Negative for chest pain, palpitations and leg swelling.   Gastrointestinal: Positive for abdominal pain, anal bleeding and blood in stool. Negative for abdominal distention,  "constipation, diarrhea, nausea, rectal pain and vomiting.        Heartburn    Endocrine: Negative.  Negative for cold intolerance, heat intolerance, polydipsia, polyphagia and polyuria.   Genitourinary: Negative.  Negative for difficulty urinating, dysuria, flank pain, frequency, hematuria and urgency.   Musculoskeletal: Positive for arthralgias. Negative for back pain, gait problem, joint swelling, myalgias, neck pain and neck stiffness.   Skin: Negative.  Negative for color change, pallor, rash and wound.   Allergic/Immunologic: Negative.  Negative for immunocompromised state.   Neurological: Negative for dizziness, syncope, weakness, light-headedness, numbness and headaches.   Hematological: Negative.  Negative for adenopathy. Does not bruise/bleed easily.   Psychiatric/Behavioral: Positive for sleep disturbance. Negative for behavioral problems, confusion, self-injury and suicidal ideas. The patient is not nervous/anxious.     Objective    Blood pressure 90/60, pulse 55, temperature 96.7 °F (35.9 °C), resp. rate 20, height 160 cm (63\"), weight (!) 155 kg (342 lb 9.6 oz).     Physical Exam   Constitutional: She is oriented to person, place, and time. She appears well-developed and well-nourished.   HENT:   Head: Normocephalic and atraumatic.   Right Ear: External ear normal.   Left Ear: External ear normal.   Nose: Nose normal.   Mouth/Throat: Oropharynx is clear and moist. No oropharyngeal exudate.   Eyes: Conjunctivae and EOM are normal. Pupils are equal, round, and reactive to light.   Neck: Normal range of motion. Neck supple. No tracheal deviation present. No thyromegaly present.   Cardiovascular: Normal rate, regular rhythm, normal heart sounds and intact distal pulses.    Pulmonary/Chest: Effort normal and breath sounds normal. No respiratory distress. She has no wheezes. She has no rales. She exhibits no tenderness.   Abdominal: Soft. Bowel sounds are normal. She exhibits no distension and no mass. There " is tenderness in the right upper quadrant and epigastric area. There is no rebound, no guarding, no tenderness at McBurney's point and negative Bautista's sign. No hernia.   Lymphadenopathy:     She has no cervical adenopathy.   Neurological: She is alert and oriented to person, place, and time. She has normal reflexes.   Skin: Skin is warm and dry.   Psychiatric: She has a normal mood and affect. Her behavior is normal. Judgment and thought content normal.       Assessment/Plan   Gladys was seen today for abdominal pain.    Diagnoses and all orders for this visit:    RUQ pain  -     CT Abdomen Pelvis Without Contrast    Nausea  -     CT Abdomen Pelvis Without Contrast    Diarrhea, unspecified type  -     CT Abdomen Pelvis Without Contrast    Right flank pain  -     POCT urinalysis dipstick, automated    Other orders  -     diazePAM (VALIUM) 5 MG tablet; Take 1 tab po BID prn anxiety      UA showed no concerns for infection. Believe majority of issues are related to GI which she will be seeing specialty for. In the meantime, due to duration of symptoms will proceed with CT of abdomen and pelvis for additional evaluation. Report to ER if any new or worsening symptoms develop. Pt and daughter agree    Refill on valium per Dr. Vasquez. Risk of abuse, addiction and dependency discussed. Pt agrees to only take as directed. Denies needing medication on a daily basis.

## 2018-03-19 NOTE — PROGRESS NOTES
CHIEF COMPLAINT: Iron deficiency anemia    REASON FOR REFERRAL: Iron deficiency anemia      RECORDS OBTAINED  Records of the patients history including those obtained from  primary care were reviewed and summarized in detail.    HISTORY OF PRESENT ILLNESS:  The patient is a 63 y.o.  female, referred for iron deficiency anemia.  I do not have a ferritin on her but her MCV is on the low end of normal at 80 and her hemoglobin is 10 where she says she has been for years.  She is having occasional chest pains that they think may be related to this and some tachycardia that they think may be related to this but her hemoglobin has not significantly changed over time.   She has been intolerant of oral iron in the past and used IV iron without complication thus far and comes to me for further management and institution perhaps of parenteral iron.  She has not tried ferrous gluconate as far as she can remember.  She does have phylicia bright red blood per rectum and it is been over 5 years since her last endoscopy and she is due to see Dr. Hall.  She does not have any dizziness when she stands.  She has no ice craving.  REVIEW OF SYSTEMS:  A 14 point review of systems was performed and is negative except as noted above.    Past Medical History:   Diagnosis Date   • Acid reflux    • Anemia    • Anxiety    • Arthritis    • Atrial fibrillation    • Chest pain    • COPD (chronic obstructive pulmonary disease)    • Diverticulitis of colon    • Gallbladder abscess    • History of left heart catheterization    • Hyperlipidemia    • Hypertension    • Stomach ulcer    • Type 2 diabetes mellitus      Past Surgical History:   Procedure Laterality Date   • CARDIAC CATHETERIZATION N/A 1/5/2018    Procedure: Left Heart Cath;  Surgeon: Quentin Lynn MD;  Location: [facility] INVASIVE LOCATION;  Service:    • CHOLECYSTECTOMY     • CHOLECYSTECTOMY OPEN     • HYSTERECTOMY         Current Outpatient Prescriptions on File Prior to Visit    Medication Sig Dispense Refill   • albuterol (PROVENTIL HFA;VENTOLIN HFA) 108 (90 BASE) MCG/ACT inhaler Inhale 2 puffs Every 4 (Four) Hours As Needed for Wheezing. 18 g 5   • albuterol (PROVENTIL) (2.5 MG/3ML) 0.083% nebulizer solution Take 2.5 mg by nebulization Every 4 (Four) Hours As Needed for Wheezing or Shortness of Air.     • aspirin 81 MG EC tablet Take 81 mg by mouth Daily.     • atenolol (TENORMIN) 50 MG tablet TAKE 1 TABLET EVERY DAY 90 tablet 1   • B-D ULTRAFINE III SHORT PEN 31G X 8 MM misc USE WITH LANTUS TWICE DAILY 100 each 4   • diazePAM (VALIUM) 5 MG tablet TAKE ONE TABLET BY MOUTH TWICE DAILY AS NEEDED 30 tablet 0   • FLUoxetine (PROzac) 40 MG capsule TAKE 1 CAPSULE BY MOUTH DAILY 90 capsule 1   • furosemide (LASIX) 40 MG tablet Take 40 mg by mouth Daily As Needed.     • glucose blood (FREESTYLE LITE) test strip Use as instructed 100 each 12   • hydrocortisone 2.5 % cream Apply  topically 2 (Two) Times a Day. 453.6 g 0   • LANTUS SOLOSTAR 100 UNIT/ML injection pen ADMINISTER 25 UNITS UNDER THE SKIN TWICE DAILY 45 mL 1   • omeprazole (priLOSEC) 20 MG capsule Take 1 capsule by mouth Daily. 90 capsule 1   • pantoprazole (PROTONIX) 40 MG EC tablet Take 1 tablet by mouth Daily. 30 tablet 5   • promethazine (PHENERGAN) 25 MG tablet Take 1 tablet by mouth Every 6 (Six) Hours As Needed for Nausea or Vomiting. 30 tablet 0   • rosuvastatin (CRESTOR) 10 MG tablet Take 1 tablet by mouth Every Night. 90 tablet 1     No current facility-administered medications on file prior to visit.        Allergies   Allergen Reactions   • Levaquin [Levofloxacin]    • Rocephin [Ceftriaxone]        Social History     Social History   • Marital status:      Occupational History   • unemployed      Social History Main Topics   • Smoking status: Former Smoker     Quit date: 2015   • Smokeless tobacco: Never Used   • Alcohol use No   • Drug use: No     Other Topics Concern   • Not on file       Family History   Problem  "Relation Age of Onset   • Heart disease Mother    • Hyperlipidemia Mother    • Diabetes Mother    • Hypertension Mother    • Cancer Father    • Heart disease Father    • Colon cancer Father    • Cancer Brother    • Cancer Maternal Grandfather        PHYSICAL EXAM:    /54   Pulse 58   Temp 98 °F (36.7 °C) (Temporal Artery )   Resp 26   Ht 160 cm (63\")   Wt (!) 152 kg (336 lb)   SpO2 94%   BMI 59.52 kg/m²     ECOG: (1) Restricted in physically strenuous activity, ambulatory and able to do work of light nature  General: Obese appearing female in no acute distress  HEENT: sclera anicteric, oropharynx clear  Lymphatics: no cervical, supraclavicular, inguinal, or axillary adenopathy  Cardiovascular: regular rate and rhythm, no murmurs  Neck: Supple; No thyromegaly  Lungs: clear to auscultation bilaterally. No respiratory distress.   Abdomen: soft, nontender, nondistended.  No palpable organomegaly  Extremities: no cyanosis, clubbing, edema, or cords  Skin: no rashes, lesions, bruising, or petechiae  Neuro: Alert and oriented x 4; Moving all extremities.  Psych: No anxiety or depression    Lab on 03/19/2018   Component Date Value Ref Range Status   • Iron 03/19/2018 41* 50 - 175 mcg/dL Final   • TIBC 03/19/2018 386  250 - 450 mcg/dL Final   • Iron Saturation 03/19/2018 11* 15 - 50 % Final   • WBC 03/19/2018 8.20  3.50 - 10.80 10*3/mm3 Final   • RBC 03/19/2018 4.34  3.89 - 5.14 10*6/mm3 Final   • Hemoglobin 03/19/2018 9.6* 11.5 - 15.5 g/dL Final   • Hematocrit 03/19/2018 32.9* 34.5 - 44.0 % Final   • RDW 03/19/2018 20.4* 11.3 - 14.5 % Final   • MCV 03/19/2018 75.8* 80.0 - 99.0 fL Final   • MCH 03/19/2018 22.1* 27.0 - 31.0 pg Final   • MCHC 03/19/2018 29.1* 32.0 - 36.0 g/dL Final   • MPV 03/19/2018 9.4  6.0 - 12.0 fL Final   • Platelets 03/19/2018 226  150 - 450 10*3/mm3 Final   • Neutrophil % 03/19/2018 56.1  41.0 - 71.0 % Final   • Lymphocyte % 03/19/2018 37.5  24.0 - 44.0 % Final   • Monocyte % 03/19/2018 6.4 "  0.0 - 12.0 % Final   • Neutrophils, Absolute 03/19/2018 4.60  1.50 - 8.30 10*3/mm3 Final   • Lymphocytes, Absolute 03/19/2018 3.10  0.60 - 4.80 10*3/mm3 Final   • Monocytes, Absolute 03/19/2018 0.50  0.00 - 1.00 10*3/mm3 Final   Office Visit on 03/05/2018   Component Date Value Ref Range Status   • WBC 03/05/2018 6.66  3.50 - 10.80 10*3/mm3 Final   • RBC 03/05/2018 4.46  3.89 - 5.14 10*6/mm3 Final   • Hemoglobin 03/05/2018 10.0* 11.5 - 15.5 g/dL Final   • Hematocrit 03/05/2018 36.0  34.5 - 44.0 % Final   • MCV 03/05/2018 80.7  80.0 - 99.0 fL Final   • MCH 03/05/2018 22.4* 27.0 - 31.0 pg Final   • MCHC 03/05/2018 27.8* 32.0 - 36.0 g/dL Final   • RDW 03/05/2018 19.1* 11.3 - 14.5 % Final   • Platelets 03/05/2018 259  150 - 450 10*3/mm3 Final   • Neutrophil Rel % 03/05/2018 48.7  41.0 - 71.0 % Final   • Lymphocyte Rel % 03/05/2018 39.5  24.0 - 44.0 % Final   • Monocyte Rel % 03/05/2018 7.5  0.0 - 12.0 % Final   • Eosinophil Rel % 03/05/2018 3.3* 0.0 - 3.0 % Final   • Basophil Rel % 03/05/2018 0.8  0.0 - 1.0 % Final   • Neutrophils Absolute 03/05/2018 3.25  1.50 - 8.30 10*3/mm3 Final   • Lymphocytes Absolute 03/05/2018 2.63  0.60 - 4.80 10*3/mm3 Final   • Monocytes Absolute 03/05/2018 0.50  0.00 - 1.00 10*3/mm3 Final   • Eosinophils Absolute 03/05/2018 0.22  0.00 - 0.30 10*3/mm3 Final   • Basophils Absolute 03/05/2018 0.05  0.00 - 0.20 10*3/mm3 Final   • Immature Granulocyte Rel % 03/05/2018 0.2  0.0 - 0.6 % Final   • Immature Grans Absolute 03/05/2018 0.01  0.00 - 0.03 10*3/mm3 Final   • Glucose 03/05/2018 198* 70 - 100 mg/dL Final   • BUN 03/05/2018 31* 9 - 23 mg/dL Final   • Creatinine 03/05/2018 1.60* 0.60 - 1.30 mg/dL Final   • eGFR Non  Am 03/05/2018 32* >60 mL/min/1.73 Final    Comment: [employer] Guidelines  Stage     Description        GFR  1         Normal or High     90+  2         Mild decrease      60-89  3         Moderate decrease  30-59  4         Severe decrease    15-29  5          Kidney failure     <15     • eGFR  Am 03/05/2018 39* >60 mL/min/1.73 Final   • BUN/Creatinine Ratio 03/05/2018 19.4  7.0 - 25.0 Final   • Sodium 03/05/2018 140  132 - 146 mmol/L Final   • Potassium 03/05/2018 5.2  3.5 - 5.5 mmol/L Final   • Chloride 03/05/2018 109  99 - 109 mmol/L Final   • Total CO2 03/05/2018 23.0  20.0 - 31.0 mmol/L Final   • Calcium 03/05/2018 9.3  8.7 - 10.4 mg/dL Final   • Total Protein 03/05/2018 7.0  5.7 - 8.2 g/dL Final   • Albumin 03/05/2018 4.10  3.20 - 4.80 g/dL Final   • Globulin 03/05/2018 2.9  gm/dL Final   • A/G Ratio 03/05/2018 1.4* 1.5 - 2.5 g/dL Final   • Total Bilirubin 03/05/2018 0.2* 0.3 - 1.2 mg/dL Final   • Alkaline Phosphatase 03/05/2018 108* 25 - 100 U/L Final   • AST (SGOT) 03/05/2018 30  0 - 33 U/L Final   • ALT (SGPT) 03/05/2018 23  7 - 40 U/L Final   • Total Cholesterol 03/05/2018 284* 0 - 200 mg/dL Final    Comment: Cholesterol Reference Ranges:   Desirable       < 200 mg/dL   Borderline    200-239 mg/dL   High Risk       > 239 mg/dL  Triglyceride Reference Ranges:   Normal          < 150 mg/dL   Borderline    150-199 mg/dL   High          200-499 mg/dL   Very High       > 499 mg/dL  HDL Reference Ranges:   Low              < 40 mg/dL   High             > 59 mg/dL  LDL Reference Ranges:   Optimal         < 100 mg/dL   Near Optimal  100-129 mg/dL   Borderline    130-159 mg/dL   High          160-189 mg/dL   Very High       > 189 mg/dL     • Triglycerides 03/05/2018 258* 0 - 150 mg/dL Final   • HDL Cholesterol 03/05/2018 58  40 - 60 mg/dL Final   • VLDL Cholesterol 03/05/2018 51.6  mg/dL Final   • LDL Cholesterol  03/05/2018 174* 0 - 100 mg/dL Final   • Hemoglobin A1C 03/05/2018 7.50* 4.80 - 5.60 % Final   • TIBC 03/05/2018 392  250 - 450 mcg/dL Final   • UIBC 03/05/2018 366  mcg/dL Final   • Iron 03/05/2018 26* 50 - 175 mcg/dL Final   • Iron Saturation 03/05/2018 7* 15 - 50 % Final       Assessment/Plan     1. Iron deficiency anemia  2. Hypertension as  above    Discussion: I've asked her to follow-up with primary care for better hypertension control which is a greater issue than her mild anemia.  She does need endoscopy and that is set with Dr. Hall. .  There is a 1% risk of anaphylaxis with Feraheme and when informed of that she is not interested in Feraheme for the moment.  She would like to try oral there be with ferrous gluconate to see if she tolerates that better and she will take this with vitamin C.  If her lower GI workup is negative then she will need an EGD.  I'll see her back in a few months to see how she tolerated the oral iron and we will decide then based on her ferritin and iron indices now along with blood counts compared to them to decide whether to go with the Feraheme.  Discussed with patient 45 minutes the complexity of this diagnosis and the management thereof      Ruy Alvarenga MD    3/19/2018

## 2018-03-19 NOTE — TELEPHONE ENCOUNTER
----- Message from Alex Cleveland sent at 3/19/2018 11:36 AM EDT -----  Contact: CARINA / ELYSE - DAUGHTER  DAUGHTER CALLED AND WANTED TO LET SARAN KNOW THAT PT IS TAKING HER CRESOTR.      ELYSE  - [phone]

## 2018-03-22 LAB — FERRITIN SERPL-MCNC: 8 NG/ML (ref 10–291)

## 2018-03-24 ENCOUNTER — HOSPITAL ENCOUNTER (OUTPATIENT)
Dept: CT IMAGING | Facility: HOSPITAL | Age: 63
Discharge: HOME | End: 2018-03-24
Admitting: PHYSICIAN ASSISTANT

## 2018-03-24 PROCEDURE — 74176 CT ABD & PELVIS W/O CONTRAST: CPT

## 2018-06-27 ENCOUNTER — TELEPHONE (OUTPATIENT)
Dept: FAMILY MEDICINE CLINIC | Facility: CLINIC | Age: 63
End: 2018-06-27

## 2018-06-27 RX ORDER — ROSUVASTATIN CALCIUM 10 MG/1
10 TABLET, COATED ORAL NIGHTLY
Qty: 90 TABLET | Refills: 1 | Status: SHIPPED | OUTPATIENT
Start: 2018-06-27

## 2018-06-27 NOTE — TELEPHONE ENCOUNTER
----- Message from Gloria Anderson sent at 6/27/2018  4:47 PM EDT -----  Contact: MARK PAPPAS-DAUGHTER CALLED  REFILL ON rosuvastatin (CRESTOR) 10 MG tablet CALLED INTO   [facility]    [phone]  MESSAGE OK

## 2018-08-08 RX ORDER — ATENOLOL 50 MG/1
TABLET ORAL
Qty: 90 TABLET | Refills: 1 | Status: SHIPPED | OUTPATIENT
Start: 2018-08-08

## 2018-08-28 RX ORDER — FLUOXETINE HYDROCHLORIDE 40 MG/1
CAPSULE ORAL
Qty: 90 CAPSULE | Refills: 0 | Status: SHIPPED | OUTPATIENT
Start: 2018-08-28

## 2018-09-11 ENCOUNTER — TELEPHONE (OUTPATIENT)
Dept: FAMILY MEDICINE CLINIC | Facility: CLINIC | Age: 63
End: 2018-09-11

## 2018-09-11 NOTE — TELEPHONE ENCOUNTER
----- Message from Gloria Anderson sent at 9/11/2018 12:02 PM EDT -----  Contact: SARAN;ELYSE-DAUGHTER  PT SHOWED UP AT 5:19 ON LAST APPT AND THE DOOR WAS LOCKED-SHE REALIZES  THIS IS HER THIRD NO SHOW AND WANT TO KNOW IF HE MOM CAN STILL BE  SEEN-FAMILY HAVING A HARD TIME GETTING HER HERE        [phone]

## 2018-09-11 NOTE — TELEPHONE ENCOUNTER
I will not be able to continue to see Gladys Cabrales as primary care provider since she has missed and rescheduled her appointment frequently making it difficulty for me to provide quality care. This is an office policy. [facility]

## 2019-05-25 RX ORDER — ATENOLOL 50 MG/1
TABLET ORAL
Qty: 90 TABLET | Refills: 1 | OUTPATIENT
Start: 2019-05-25

## 2019-09-01 RX ORDER — PEN NEEDLE, DIABETIC 31 GX5/16"
NEEDLE, DISPOSABLE MISCELLANEOUS
Refills: 0 | OUTPATIENT
Start: 2019-09-01

## (undated) DEVICE — A2000 MULTI-USE SYRINGE KIT, P/N 701277-003KIT CONTENTS: 100ML CONTRAST RESERVOIR AND TUBING WITH CONTRAST SPIKE AND CLAMP: Brand: A2000 MULTI-USE SYRINGE KIT

## (undated) DEVICE — GLIDESHEATH SLENDER STAINLESS STEEL KIT: Brand: GLIDESHEATH SLENDER

## (undated) DEVICE — PK CATH CARD 10

## (undated) DEVICE — Device

## (undated) DEVICE — DEV COMP RAD PRELUDESYNC 24CM

## (undated) DEVICE — CATH DIAG EXPO M/ PK 6FR FL4/FR4 PIG 3PK

## (undated) DEVICE — MODEL AT P65, P/N 701554-001KIT CONTENTS: HAND CONTROLLER, 3-WAY HIGH-PRESSURE STOPCOCK WITH ROTATING END AND PREMIUM HIGH-PRESSURE TUBING: Brand: ANGIOTOUCH® KIT

## (undated) DEVICE — CATH DIAG EXPO .056 FL3.5 6F 100CM

## (undated) DEVICE — MODEL BT2000 P/N 700287-012KIT CONTENTS: MANIFOLD WITH SALINE AND CONTRAST PORTS, SALINE TUBING WITH SPIKE AND HAND SYRINGE, TRANSDUCER: Brand: BT2000 AUTOMATED MANIFOLD KIT